# Patient Record
Sex: FEMALE | Race: WHITE | NOT HISPANIC OR LATINO
[De-identification: names, ages, dates, MRNs, and addresses within clinical notes are randomized per-mention and may not be internally consistent; named-entity substitution may affect disease eponyms.]

---

## 2017-03-15 ENCOUNTER — NON-APPOINTMENT (OUTPATIENT)
Age: 62
End: 2017-03-15

## 2017-03-15 ENCOUNTER — APPOINTMENT (OUTPATIENT)
Dept: INTERNAL MEDICINE | Facility: CLINIC | Age: 62
End: 2017-03-15

## 2017-03-15 VITALS — DIASTOLIC BLOOD PRESSURE: 82 MMHG | SYSTOLIC BLOOD PRESSURE: 130 MMHG

## 2017-03-15 VITALS — BODY MASS INDEX: 22.47 KG/M2 | WEIGHT: 119 LBS | HEIGHT: 61 IN

## 2017-03-15 VITALS — SYSTOLIC BLOOD PRESSURE: 130 MMHG | DIASTOLIC BLOOD PRESSURE: 80 MMHG

## 2017-05-02 ENCOUNTER — APPOINTMENT (OUTPATIENT)
Dept: INTERNAL MEDICINE | Facility: CLINIC | Age: 62
End: 2017-05-02

## 2017-05-06 ENCOUNTER — RX RENEWAL (OUTPATIENT)
Age: 62
End: 2017-05-06

## 2017-05-19 ENCOUNTER — RX RENEWAL (OUTPATIENT)
Age: 62
End: 2017-05-19

## 2017-06-01 ENCOUNTER — RX RENEWAL (OUTPATIENT)
Age: 62
End: 2017-06-01

## 2017-06-14 ENCOUNTER — RECORD ABSTRACTING (OUTPATIENT)
Age: 62
End: 2017-06-14

## 2017-06-14 DIAGNOSIS — R09.82 POSTNASAL DRIP: ICD-10-CM

## 2017-06-19 ENCOUNTER — LABORATORY RESULT (OUTPATIENT)
Age: 62
End: 2017-06-19

## 2017-06-19 ENCOUNTER — APPOINTMENT (OUTPATIENT)
Dept: INTERNAL MEDICINE | Facility: CLINIC | Age: 62
End: 2017-06-19

## 2017-06-19 ENCOUNTER — NON-APPOINTMENT (OUTPATIENT)
Age: 62
End: 2017-06-19

## 2017-06-19 VITALS — WEIGHT: 120 LBS | HEIGHT: 61 IN | BODY MASS INDEX: 22.66 KG/M2

## 2017-06-19 VITALS — SYSTOLIC BLOOD PRESSURE: 138 MMHG | DIASTOLIC BLOOD PRESSURE: 82 MMHG

## 2017-06-19 VITALS — DIASTOLIC BLOOD PRESSURE: 88 MMHG | SYSTOLIC BLOOD PRESSURE: 138 MMHG

## 2017-06-19 LAB
BILIRUB UR QL STRIP: NORMAL
CLARITY UR: CLEAR
COLLECTION METHOD: NORMAL
GLUCOSE UR-MCNC: NORMAL
HCG UR QL: 0.2 EU/DL
HGB UR QL STRIP.AUTO: NORMAL
KETONES UR-MCNC: NORMAL
LEUKOCYTE ESTERASE UR QL STRIP: NORMAL
NITRITE UR QL STRIP: NORMAL
PH UR STRIP: 6
PROT UR STRIP-MCNC: NORMAL
SP GR UR STRIP: 1.01

## 2017-06-20 LAB
25(OH)D3 SERPL-MCNC: 41.2 NG/ML
ALBUMIN SERPL ELPH-MCNC: 4.3 G/DL
ALP BLD-CCNC: 61 U/L
ALT SERPL-CCNC: 18 U/L
ANION GAP SERPL CALC-SCNC: 15 MMOL/L
AST SERPL-CCNC: 25 U/L
BASOPHILS # BLD AUTO: 0.06 K/UL
BASOPHILS NFR BLD AUTO: 0.8 %
BILIRUB SERPL-MCNC: 0.2 MG/DL
BUN SERPL-MCNC: 29 MG/DL
CALCIUM SERPL-MCNC: 10.2 MG/DL
CHLORIDE SERPL-SCNC: 102 MMOL/L
CHOLEST SERPL-MCNC: 149 MG/DL
CHOLEST/HDLC SERPL: 3.4 RATIO
CO2 SERPL-SCNC: 23 MMOL/L
CREAT SERPL-MCNC: 1.14 MG/DL
EOSINOPHIL # BLD AUTO: 0.25 K/UL
EOSINOPHIL NFR BLD AUTO: 3.5 %
GLUCOSE SERPL-MCNC: 104 MG/DL
HBA1C MFR BLD HPLC: 5.6 %
HCT VFR BLD CALC: 40.1 %
HDLC SERPL-MCNC: 44 MG/DL
HGB BLD-MCNC: 13.2 G/DL
IMM GRANULOCYTES NFR BLD AUTO: 0.1 %
LDLC SERPL CALC-MCNC: 81 MG/DL
LYMPHOCYTES # BLD AUTO: 1.59 K/UL
LYMPHOCYTES NFR BLD AUTO: 22.4 %
MAN DIFF?: NORMAL
MCHC RBC-ENTMCNC: 31.3 PG
MCHC RBC-ENTMCNC: 32.9 GM/DL
MCV RBC AUTO: 95 FL
MONOCYTES # BLD AUTO: 0.52 K/UL
MONOCYTES NFR BLD AUTO: 7.3 %
NEUTROPHILS # BLD AUTO: 4.66 K/UL
NEUTROPHILS NFR BLD AUTO: 65.9 %
PLATELET # BLD AUTO: 320 K/UL
POTASSIUM SERPL-SCNC: 4.1 MMOL/L
PROT SERPL-MCNC: 7.7 G/DL
RBC # BLD: 4.22 M/UL
RBC # FLD: 12.8 %
SAVE SPECIMEN: NORMAL
SODIUM SERPL-SCNC: 140 MMOL/L
T3RU NFR SERPL: 1.04 INDEX
T4 SERPL-MCNC: 8.1 UG/DL
TRIGL SERPL-MCNC: 118 MG/DL
TSH SERPL-ACNC: 2.4 UIU/ML
URATE SERPL-MCNC: 5.7 MG/DL
WBC # FLD AUTO: 7.09 K/UL

## 2017-09-26 ENCOUNTER — LABORATORY RESULT (OUTPATIENT)
Age: 62
End: 2017-09-26

## 2017-09-26 ENCOUNTER — APPOINTMENT (OUTPATIENT)
Dept: INTERNAL MEDICINE | Facility: CLINIC | Age: 62
End: 2017-09-26
Payer: COMMERCIAL

## 2017-09-26 VITALS — DIASTOLIC BLOOD PRESSURE: 88 MMHG | SYSTOLIC BLOOD PRESSURE: 138 MMHG

## 2017-09-26 VITALS — BODY MASS INDEX: 22.28 KG/M2 | WEIGHT: 118 LBS | HEIGHT: 61 IN

## 2017-09-26 VITALS — SYSTOLIC BLOOD PRESSURE: 122 MMHG | DIASTOLIC BLOOD PRESSURE: 80 MMHG

## 2017-09-26 PROCEDURE — 90686 IIV4 VACC NO PRSV 0.5 ML IM: CPT

## 2017-09-26 PROCEDURE — 36415 COLL VENOUS BLD VENIPUNCTURE: CPT

## 2017-09-26 PROCEDURE — G0008: CPT

## 2017-09-26 PROCEDURE — 99214 OFFICE O/P EST MOD 30 MIN: CPT | Mod: 25

## 2017-09-27 LAB
25(OH)D3 SERPL-MCNC: 37.2 NG/ML
ALBUMIN SERPL ELPH-MCNC: 4.7 G/DL
ALP BLD-CCNC: 57 U/L
ALT SERPL-CCNC: 15 U/L
ANION GAP SERPL CALC-SCNC: 17 MMOL/L
AST SERPL-CCNC: 24 U/L
BASOPHILS # BLD AUTO: 0.06 K/UL
BASOPHILS NFR BLD AUTO: 0.8 %
BILIRUB SERPL-MCNC: 0.5 MG/DL
BUN SERPL-MCNC: 26 MG/DL
CALCIUM SERPL-MCNC: 10.3 MG/DL
CHLORIDE SERPL-SCNC: 97 MMOL/L
CHOLEST SERPL-MCNC: 180 MG/DL
CHOLEST/HDLC SERPL: 3.6 RATIO
CO2 SERPL-SCNC: 26 MMOL/L
CREAT SERPL-MCNC: 1.03 MG/DL
EOSINOPHIL # BLD AUTO: 0.13 K/UL
EOSINOPHIL NFR BLD AUTO: 1.7 %
GLUCOSE SERPL-MCNC: 87 MG/DL
HBA1C MFR BLD HPLC: 5.5 %
HCT VFR BLD CALC: 41.7 %
HDLC SERPL-MCNC: 50 MG/DL
HGB BLD-MCNC: 13.7 G/DL
IMM GRANULOCYTES NFR BLD AUTO: 0.3 %
LDLC SERPL CALC-MCNC: 86 MG/DL
LYMPHOCYTES # BLD AUTO: 1.87 K/UL
LYMPHOCYTES NFR BLD AUTO: 23.8 %
MAN DIFF?: NORMAL
MCHC RBC-ENTMCNC: 30.5 PG
MCHC RBC-ENTMCNC: 32.9 GM/DL
MCV RBC AUTO: 92.9 FL
MONOCYTES # BLD AUTO: 0.64 K/UL
MONOCYTES NFR BLD AUTO: 8.2 %
NEUTROPHILS # BLD AUTO: 5.13 K/UL
NEUTROPHILS NFR BLD AUTO: 65.2 %
PLATELET # BLD AUTO: 394 K/UL
POTASSIUM SERPL-SCNC: 4.4 MMOL/L
PROT SERPL-MCNC: 8.2 G/DL
RBC # BLD: 4.49 M/UL
RBC # FLD: 14.8 %
SAVE SPECIMEN: NORMAL
SODIUM SERPL-SCNC: 140 MMOL/L
T3RU NFR SERPL: 1.04 INDEX
T4 SERPL-MCNC: 8.5 UG/DL
TRIGL SERPL-MCNC: 220 MG/DL
TSH SERPL-ACNC: 2.03 UIU/ML
URATE SERPL-MCNC: 5.5 MG/DL
WBC # FLD AUTO: 7.85 K/UL

## 2018-01-24 ENCOUNTER — RX RENEWAL (OUTPATIENT)
Age: 63
End: 2018-01-24

## 2018-02-03 ENCOUNTER — RX RENEWAL (OUTPATIENT)
Age: 63
End: 2018-02-03

## 2018-02-22 ENCOUNTER — TRANSCRIPTION ENCOUNTER (OUTPATIENT)
Age: 63
End: 2018-02-22

## 2018-03-07 ENCOUNTER — APPOINTMENT (OUTPATIENT)
Dept: INTERNAL MEDICINE | Facility: CLINIC | Age: 63
End: 2018-03-07
Payer: COMMERCIAL

## 2018-03-07 ENCOUNTER — MEDICATION RENEWAL (OUTPATIENT)
Age: 63
End: 2018-03-07

## 2018-03-07 VITALS — HEART RATE: 91 BPM | OXYGEN SATURATION: 97 %

## 2018-03-07 VITALS
WEIGHT: 121 LBS | HEIGHT: 61 IN | HEART RATE: 74 BPM | DIASTOLIC BLOOD PRESSURE: 82 MMHG | SYSTOLIC BLOOD PRESSURE: 138 MMHG | TEMPERATURE: 98.2 F | BODY MASS INDEX: 22.84 KG/M2

## 2018-03-07 PROCEDURE — 94010 BREATHING CAPACITY TEST: CPT

## 2018-03-07 PROCEDURE — 99214 OFFICE O/P EST MOD 30 MIN: CPT | Mod: 25

## 2018-03-12 LAB
DOPAMINE UR-MCNC: 123 UG/L
DOPAMINE, UR, 24HR: 178 UG/24 HR
EPINEPH UR-MCNC: 3 UG/L
EPINEPHRINE, U, 24HR: 4 UG/24 HR
NOREPINEPH UR-MCNC: 26 UG/L
NOREPINEPHRINE,U,24H: 38 UG/24 HR

## 2018-03-23 ENCOUNTER — LABORATORY RESULT (OUTPATIENT)
Age: 63
End: 2018-03-23

## 2018-03-23 ENCOUNTER — APPOINTMENT (OUTPATIENT)
Dept: INTERNAL MEDICINE | Facility: CLINIC | Age: 63
End: 2018-03-23
Payer: COMMERCIAL

## 2018-03-23 VITALS
HEIGHT: 61 IN | WEIGHT: 121 LBS | SYSTOLIC BLOOD PRESSURE: 132 MMHG | DIASTOLIC BLOOD PRESSURE: 78 MMHG | BODY MASS INDEX: 22.84 KG/M2

## 2018-03-23 PROCEDURE — 99214 OFFICE O/P EST MOD 30 MIN: CPT | Mod: 25

## 2018-03-23 PROCEDURE — 36415 COLL VENOUS BLD VENIPUNCTURE: CPT

## 2018-03-23 RX ORDER — MONTELUKAST SODIUM 10 MG/1
10 TABLET, FILM COATED ORAL
Qty: 1 | Refills: 3 | Status: DISCONTINUED | COMMUNITY
Start: 2018-03-07 | End: 2018-03-23

## 2018-03-23 RX ORDER — FLUTICASONE PROPIONATE 50 UG/1
50 SPRAY, METERED NASAL DAILY
Qty: 1 | Refills: 0 | Status: DISCONTINUED | COMMUNITY
Start: 2018-03-07 | End: 2018-03-23

## 2018-03-24 LAB
25(OH)D3 SERPL-MCNC: 35.2 NG/ML
ALBUMIN SERPL ELPH-MCNC: 4.2 G/DL
ALP BLD-CCNC: 62 U/L
ALT SERPL-CCNC: 16 U/L
ANION GAP SERPL CALC-SCNC: 14 MMOL/L
AST SERPL-CCNC: 22 U/L
BASOPHILS # BLD AUTO: 0.06 K/UL
BASOPHILS NFR BLD AUTO: 0.9 %
BILIRUB SERPL-MCNC: 0.4 MG/DL
BUN SERPL-MCNC: 22 MG/DL
CALCIUM SERPL-MCNC: 10.5 MG/DL
CHLORIDE SERPL-SCNC: 100 MMOL/L
CHOLEST SERPL-MCNC: 161 MG/DL
CHOLEST/HDLC SERPL: 3.1 RATIO
CO2 SERPL-SCNC: 28 MMOL/L
CREAT SERPL-MCNC: 1.07 MG/DL
EOSINOPHIL # BLD AUTO: 0.14 K/UL
EOSINOPHIL NFR BLD AUTO: 2.1 %
GLUCOSE SERPL-MCNC: 84 MG/DL
HBA1C MFR BLD HPLC: 5.6 %
HCT VFR BLD CALC: 40.1 %
HDLC SERPL-MCNC: 52 MG/DL
HGB BLD-MCNC: 13.1 G/DL
IMM GRANULOCYTES NFR BLD AUTO: 0.3 %
LDLC SERPL CALC-MCNC: 82 MG/DL
LYMPHOCYTES # BLD AUTO: 1.86 K/UL
LYMPHOCYTES NFR BLD AUTO: 28.1 %
MAN DIFF?: NORMAL
MCHC RBC-ENTMCNC: 30.9 PG
MCHC RBC-ENTMCNC: 32.7 GM/DL
MCV RBC AUTO: 94.6 FL
MONOCYTES # BLD AUTO: 0.85 K/UL
MONOCYTES NFR BLD AUTO: 12.8 %
NEUTROPHILS # BLD AUTO: 3.69 K/UL
NEUTROPHILS NFR BLD AUTO: 55.8 %
PLATELET # BLD AUTO: 358 K/UL
POTASSIUM SERPL-SCNC: 4 MMOL/L
PROT SERPL-MCNC: 8.1 G/DL
RBC # BLD: 4.24 M/UL
RBC # FLD: 12.8 %
SAVE SPECIMEN: NORMAL
SODIUM SERPL-SCNC: 142 MMOL/L
T3RU NFR SERPL: 0.96 INDEX
T4 SERPL-MCNC: 7.8 UG/DL
TRIGL SERPL-MCNC: 134 MG/DL
TSH SERPL-ACNC: 3.94 UIU/ML
URATE SERPL-MCNC: 5.1 MG/DL
WBC # FLD AUTO: 6.62 K/UL

## 2018-06-19 ENCOUNTER — APPOINTMENT (OUTPATIENT)
Dept: INTERNAL MEDICINE | Facility: CLINIC | Age: 63
End: 2018-06-19
Payer: COMMERCIAL

## 2018-07-02 ENCOUNTER — NON-APPOINTMENT (OUTPATIENT)
Age: 63
End: 2018-07-02

## 2018-07-02 ENCOUNTER — APPOINTMENT (OUTPATIENT)
Dept: INTERNAL MEDICINE | Facility: CLINIC | Age: 63
End: 2018-07-02
Payer: COMMERCIAL

## 2018-07-02 ENCOUNTER — LABORATORY RESULT (OUTPATIENT)
Age: 63
End: 2018-07-02

## 2018-07-02 VITALS — HEIGHT: 61 IN | BODY MASS INDEX: 22.47 KG/M2 | WEIGHT: 119 LBS

## 2018-07-02 PROCEDURE — 93000 ELECTROCARDIOGRAM COMPLETE: CPT

## 2018-07-02 PROCEDURE — 99214 OFFICE O/P EST MOD 30 MIN: CPT | Mod: 25

## 2018-07-02 PROCEDURE — 36415 COLL VENOUS BLD VENIPUNCTURE: CPT

## 2018-07-02 RX ORDER — HYDROCODONE BITARTRATE AND HOMATROPINE METHYLBROMIDE 5; 1.5 MG/5ML; MG/5ML
5-1.5 SYRUP ORAL EVERY 6 HOURS
Qty: 1 | Refills: 0 | Status: DISCONTINUED | COMMUNITY
Start: 2018-03-07 | End: 2018-07-02

## 2018-07-02 RX ORDER — PROMETHAZINE HYDROCHLORIDE AND CODEINE PHOSPHATE 6.25; 1 MG/5ML; MG/5ML
6.25-1 SOLUTION ORAL
Qty: 120 | Refills: 0 | Status: DISCONTINUED | COMMUNITY
Start: 2018-03-07 | End: 2018-07-02

## 2018-07-02 NOTE — PHYSICAL EXAM
[No Acute Distress] : no acute distress [Well Nourished] : well nourished [Well Developed] : well developed [Normal Sclera/Conjunctiva] : normal sclera/conjunctiva [No JVD] : no jugular venous distention [No Respiratory Distress] : no respiratory distress  [Clear to Auscultation] : lungs were clear to auscultation bilaterally [No Accessory Muscle Use] : no accessory muscle use [Soft] : abdomen soft [No CVA Tenderness] : no CVA  tenderness [No Rash] : no rash [No Focal Deficits] : no focal deficits [Normal Affect] : the affect was normal [de-identified] : Multiple sclerosis healing no evidence of infection

## 2018-07-02 NOTE — HISTORY OF PRESENT ILLNESS
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

## 2018-07-03 LAB
25(OH)D3 SERPL-MCNC: 29.8 NG/ML
ALBUMIN SERPL ELPH-MCNC: 4.5 G/DL
ALP BLD-CCNC: 86 U/L
ALT SERPL-CCNC: 24 U/L
ANION GAP SERPL CALC-SCNC: 17 MMOL/L
AST SERPL-CCNC: 23 U/L
BASOPHILS # BLD AUTO: 0.08 K/UL
BASOPHILS NFR BLD AUTO: 1 %
BILIRUB SERPL-MCNC: 0.3 MG/DL
BUN SERPL-MCNC: 32 MG/DL
CALCIUM SERPL-MCNC: 10.2 MG/DL
CHLORIDE SERPL-SCNC: 100 MMOL/L
CHOLEST SERPL-MCNC: 221 MG/DL
CHOLEST/HDLC SERPL: 3.6 RATIO
CO2 SERPL-SCNC: 25 MMOL/L
CREAT SERPL-MCNC: 1.22 MG/DL
EOSINOPHIL # BLD AUTO: 0.26 K/UL
EOSINOPHIL NFR BLD AUTO: 3.2 %
GLUCOSE SERPL-MCNC: 97 MG/DL
HBA1C MFR BLD HPLC: 5 %
HCT VFR BLD CALC: 37.6 %
HDLC SERPL-MCNC: 62 MG/DL
HGB BLD-MCNC: 11.9 G/DL
IMM GRANULOCYTES NFR BLD AUTO: 0.2 %
LDLC SERPL CALC-MCNC: 125 MG/DL
LYMPHOCYTES # BLD AUTO: 2.17 K/UL
LYMPHOCYTES NFR BLD AUTO: 26.3 %
MAN DIFF?: NORMAL
MCHC RBC-ENTMCNC: 30.3 PG
MCHC RBC-ENTMCNC: 31.6 GM/DL
MCV RBC AUTO: 95.7 FL
MONOCYTES # BLD AUTO: 0.8 K/UL
MONOCYTES NFR BLD AUTO: 9.7 %
NEUTROPHILS # BLD AUTO: 4.92 K/UL
NEUTROPHILS NFR BLD AUTO: 59.6 %
PLATELET # BLD AUTO: 281 K/UL
POTASSIUM SERPL-SCNC: 3.9 MMOL/L
PROT SERPL-MCNC: 7.7 G/DL
RBC # BLD: 3.93 M/UL
RBC # FLD: 15.5 %
SAVE SPECIMEN: NORMAL
SODIUM SERPL-SCNC: 142 MMOL/L
T3RU NFR SERPL: 1.06 INDEX
T4 SERPL-MCNC: 7.3 UG/DL
TRIGL SERPL-MCNC: 171 MG/DL
TSH SERPL-ACNC: 2.45 UIU/ML
URATE SERPL-MCNC: 5.2 MG/DL
WBC # FLD AUTO: 8.25 K/UL

## 2018-08-17 ENCOUNTER — OTHER (OUTPATIENT)
Age: 63
End: 2018-08-17

## 2018-08-17 ENCOUNTER — EMERGENCY (EMERGENCY)
Facility: HOSPITAL | Age: 63
LOS: 1 days | Discharge: HOME | End: 2018-08-17
Attending: EMERGENCY MEDICINE | Admitting: EMERGENCY MEDICINE
Payer: COMMERCIAL

## 2018-08-17 VITALS
OXYGEN SATURATION: 99 % | RESPIRATION RATE: 16 BRPM | HEART RATE: 85 BPM | DIASTOLIC BLOOD PRESSURE: 94 MMHG | SYSTOLIC BLOOD PRESSURE: 149 MMHG | TEMPERATURE: 98 F

## 2018-08-17 LAB
APPEARANCE UR: CLEAR — SIGNIFICANT CHANGE UP
BACTERIA # UR AUTO: NEGATIVE — SIGNIFICANT CHANGE UP
BILIRUB UR-MCNC: NEGATIVE — SIGNIFICANT CHANGE UP
BLOOD UR QL VISUAL: NEGATIVE — SIGNIFICANT CHANGE UP
COLOR SPEC: SIGNIFICANT CHANGE UP
GLUCOSE UR-MCNC: NEGATIVE — SIGNIFICANT CHANGE UP
KETONES UR-MCNC: NEGATIVE — SIGNIFICANT CHANGE UP
LEUKOCYTE ESTERASE UR-ACNC: SIGNIFICANT CHANGE UP
NITRITE UR-MCNC: NEGATIVE — SIGNIFICANT CHANGE UP
PH UR: 6 — SIGNIFICANT CHANGE UP (ref 5–8)
PROT UR-MCNC: SIGNIFICANT CHANGE UP
RBC CASTS # UR COMP ASSIST: SIGNIFICANT CHANGE UP (ref 0–?)
SP GR SPEC: 1.02 — SIGNIFICANT CHANGE UP (ref 1–1.04)
SQUAMOUS # UR AUTO: SIGNIFICANT CHANGE UP
UROBILINOGEN FLD QL: NORMAL — SIGNIFICANT CHANGE UP
WBC CASTS UR QL COMP ASSIST: NEGATIVE — SIGNIFICANT CHANGE UP
WBC UR QL: HIGH (ref 0–?)

## 2018-08-17 PROCEDURE — 93971 EXTREMITY STUDY: CPT | Mod: 26,LT

## 2018-08-17 PROCEDURE — 99284 EMERGENCY DEPT VISIT MOD MDM: CPT

## 2018-08-17 NOTE — ED PROVIDER NOTE - OBJECTIVE STATEMENT
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 Pt states she noticed it while she was on vacation in [place]- Called her PMD who recommends an doppler but would not have results back today so to go to the ED. Pt is leaving for [facility] for grandsons 2nd birthday. Denies fevers, chills, chest pain, SOB ,cough, n/v/d, abdominal pain, numbness, tingling, weakness, urinary symptoms. Ambulating without difficulty. 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 Pt states she noticed it while she was on vacation in [place]- Called her PMD who recommends an doppler but would not have results back today so to go to the ED. Pt is leaving for [facility] for grandsons 2nd birthday. Denies fevers, chills, chest pain, SOB ,cough, n/v/d, abdominal pain, numbness, tingling, weakness, urinary symptoms. Ambulating without difficulty. 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 Pt states she noticed it while she was on vacation in [place]- Called her PMD who recommends an doppler but would not have results back today so to go to the ED. Pt is leaving for [facility] for grandsons 2nd birthday. Denies fevers, chills, chest pain, SOB ,cough, n/v/d, abdominal pain, numbness, tingling, weakness, urinary symptoms. Ambulating without difficulty.  18:00 Zane att: 63F h/o htn, von-hippel-lindau syndrome s/p 63 y.o female pmhx of HTN and Von-hippel-lindau syndrome s/p R sided partial nephrectomy here with LLE swelling for the past 1.5 weeks. Pt states she noticed it while she was on vacation in [place]- Called her PMD who recommends an doppler but would not have results back today so to go to the ED. Pt is leaving for [facility] for grandsons 2nd birthday. Denies fevers, chills, chest pain, SOB ,cough, n/v/d, abdominal pain, numbness, tingling, weakness, urinary symptoms. Ambulating without difficulty.  18:00 Zane att: 63F h/o htn, von-hippel-lindau syndrome, rt partial nephrectomy, s/p remote lt posterior tibial tendon repair c/o lle swelling x 1-2 wk. Patient recently flew from [place], < 6 h flight, during trip noted lt ankle swollen. PCP referred to ER for duplex. Pt denies cp, sob, pleurisy, leg pain, h/o dvt, trauma.

## 2018-08-17 NOTE — ED PROVIDER NOTE - CARE PLAN
Assessment and plan of treatment:	Continue all previously prescribed medications as directed.  Follow up with your primary care physician in 48-72 hours- bring copies of your results have repeat US in 1 week.  Return to the Emergency Department for fevers, worsening or persistent symptoms OR ANY NEW OR CONCERNING SYMPTOMS. Principal Discharge DX:	Ankle swelling, left  Assessment and plan of treatment:	Continue all previously prescribed medications as directed.  Follow up with your primary care physician in 48-72 hours- bring copies of your results have repeat US in 1 week.  Return to the Emergency Department for fevers, worsening or persistent symptoms OR ANY NEW OR CONCERNING SYMPTOMS.

## 2018-08-17 NOTE — ED PROVIDER NOTE - PLAN OF CARE
Continue all previously prescribed medications as directed.  Follow up with your primary care physician in 48-72 hours- bring copies of your results have repeat US in 1 week.  Return to the Emergency Department for fevers, worsening or persistent symptoms OR ANY NEW OR CONCERNING SYMPTOMS.

## 2018-08-17 NOTE — ED ADULT TRIAGE NOTE - CHIEF COMPLAINT QUOTE
Pt s/o partial nephrectomy 10 weeks  ago , c/o L leg swelling x 1 week.  Pt with hx of VHL, hypertension

## 2018-08-17 NOTE — ED PROVIDER NOTE - ATTENDING CONTRIBUTION TO CARE
Dr. Degroot: I performed a face to face bedside interview with patient regarding history of present illness, review of symptoms and past medical history. I completed an independent physical exam.  I have discussed patient's plan of care with PA.   I agree with note as stated above, having amended the EMR as needed to reflect my findings.   This includes HISTORY OF PRESENT ILLNESS, HIV, PAST MEDICAL/SURGICAL/FAMILY/SOCIAL HISTORY, ALLERGIES AND HOME MEDICATIONS, REVIEW OF SYSTEMS, PHYSICAL EXAM, and any PROGRESS NOTES during the time I functioned as the attending physician for this patient. HPI above as by me. PE above as by me. R/o duplex, given ankle flattening suspect ligamenous injury, pt confirms h/o lt posterior tibial tendon rupture, will f/u with her podiatrist in a few days if duplex neg.

## 2018-08-17 NOTE — ED PROVIDER NOTE - PHYSICAL EXAMINATION
abdominal surgical incision site healing well  no calf tenderness/erythema or skin changes  no ankle tenderness, FROM  LLE with mildly swelling at distal leg compared to RLE abdominal surgical incision site healing well  no calf tenderness/erythema or skin changes  no ankle tenderness, FROM  LLE with mildly swelling at distal leg compared to RLE    Zane att: nad, aaox3, ctabl, rrr, s1s2, abd soft ntnd, neg kilo's sign b/l, rt ankle slight flattening compared to lt with intact rom

## 2018-08-17 NOTE — ED PROVIDER NOTE - PROGRESS NOTE DETAILS
charmaine cunningham; US no dvt- reports printed for patient to follow up with PMD, advised repeat US with them in 1 week, return precautions given. ready for dc.

## 2018-08-17 NOTE — ED PROVIDER NOTE - MEDICAL DECISION MAKING DETAILS
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

## 2018-09-19 ENCOUNTER — APPOINTMENT (OUTPATIENT)
Dept: INTERNAL MEDICINE | Facility: CLINIC | Age: 63
End: 2018-09-19

## 2018-10-31 ENCOUNTER — RX RENEWAL (OUTPATIENT)
Age: 63
End: 2018-10-31

## 2018-12-11 ENCOUNTER — APPOINTMENT (OUTPATIENT)
Dept: INTERNAL MEDICINE | Facility: CLINIC | Age: 63
End: 2018-12-11
Payer: COMMERCIAL

## 2018-12-11 ENCOUNTER — NON-APPOINTMENT (OUTPATIENT)
Age: 63
End: 2018-12-11

## 2018-12-11 VITALS — SYSTOLIC BLOOD PRESSURE: 142 MMHG | DIASTOLIC BLOOD PRESSURE: 82 MMHG

## 2018-12-11 VITALS
BODY MASS INDEX: 23.03 KG/M2 | DIASTOLIC BLOOD PRESSURE: 80 MMHG | HEIGHT: 61 IN | SYSTOLIC BLOOD PRESSURE: 118 MMHG | WEIGHT: 122 LBS

## 2018-12-11 PROCEDURE — 99396 PREV VISIT EST AGE 40-64: CPT | Mod: 25

## 2018-12-11 PROCEDURE — 81003 URINALYSIS AUTO W/O SCOPE: CPT | Mod: QW

## 2018-12-11 PROCEDURE — 93000 ELECTROCARDIOGRAM COMPLETE: CPT

## 2018-12-11 RX ORDER — CLOBETASOL PROPIONATE 0.5 MG/G
0.05 CREAM TOPICAL TWICE DAILY
Refills: 0 | Status: DISCONTINUED | COMMUNITY
Start: 2018-07-02 | End: 2018-12-11

## 2018-12-11 NOTE — PHYSICAL EXAM
[No Acute Distress] : no acute distress [Well Nourished] : well nourished [Well Developed] : well developed [Well-Appearing] : well-appearing [Normal Sclera/Conjunctiva] : normal sclera/conjunctiva [PERRL] : pupils equal round and reactive to light [EOMI] : extraocular movements intact [Fundoscopic Exam Performed] : fundoscopic ~T exam ~C was performed [Normal Outer Ear/Nose] : the outer ears and nose were normal in appearance [Normal Oropharynx] : the oropharynx was normal [No JVD] : no jugular venous distention [Supple] : supple [No Lymphadenopathy] : no lymphadenopathy [Thyroid Normal, No Nodules] : the thyroid was normal and there were no nodules present [No Respiratory Distress] : no respiratory distress  [Clear to Auscultation] : lungs were clear to auscultation bilaterally [No Accessory Muscle Use] : no accessory muscle use [Normal Rate] : normal rate  [Regular Rhythm] : with a regular rhythm [Normal S1, S2] : normal S1 and S2 [No Murmur] : no murmur heard [No Carotid Bruits] : no carotid bruits [No Abdominal Bruit] : a ~M bruit was not heard ~T in the abdomen [No Varicosities] : no varicosities [Pedal Pulses Present] : the pedal pulses are present [No Edema] : there was no peripheral edema [No Extremity Clubbing/Cyanosis] : no extremity clubbing/cyanosis [No Palpable Aorta] : no palpable aorta [Normal Appearance] : normal in appearance [No Nipple Discharge] : no nipple discharge [Soft] : abdomen soft [Non Tender] : non-tender [Non-distended] : non-distended [No Masses] : no abdominal mass palpated [No HSM] : no HSM [Normal Bowel Sounds] : normal bowel sounds [Normal Sphincter Tone] : normal sphincter tone [No Mass] : no mass [Normal Posterior Cervical Nodes] : no posterior cervical lymphadenopathy [Normal Anterior Cervical Nodes] : no anterior cervical lymphadenopathy [No CVA Tenderness] : no CVA  tenderness [No Spinal Tenderness] : no spinal tenderness [No Joint Swelling] : no joint swelling [Grossly Normal Strength/Tone] : grossly normal strength/tone [No Rash] : no rash [Normal Gait] : normal gait [Coordination Grossly Intact] : coordination grossly intact [No Focal Deficits] : no focal deficits [Deep Tendon Reflexes (DTR)] : deep tendon reflexes were 2+ and symmetric [Normal Affect] : the affect was normal [Normal Insight/Judgement] : insight and judgment were intact [Stool Occult Blood] : stool negative for occult blood

## 2018-12-11 NOTE — ASSESSMENT
[FreeTextEntry1] : This is a upbeat 63-year-old female whose history is reviewed above\par \par She has a history of hypertension her blood pressure occasionally goes above goal however as soon as she is distracted her blood pressure is at target she has no orthostasis no medication changes\par \par She has a history of hypercholesterolemia she remains on a statin for cholesterol profile has been obtained medication changes per dictated on the results\par \par She has multiple renal masses retinal angiomas and minimal CNS vascular entities. She is followed up appropriately. She continues to have the renal masses removed as they become ominous\par \par In addition she is considering entering a study which we inhibit renal mass growth\par \par She does have a mild gastroenteritis which the other members of her house also are experiencing so we deferred blood tests I will repeat them in a week\par \par She had a colonoscopy in 2014 and a stress test which was normal in 2015\par \par She does have a colonic polyp which was description seem to be hyperplastic but I told her to check with her colonoscopy just to see when she is due\par \par She will schedule mammography and OB/GYN. Bone density she did have a flu shot. In terms of further are vaccinations she should discuss this with the study group\par

## 2018-12-11 NOTE — HISTORY OF PRESENT ILLNESS
[FreeTextEntry1] : This is a 63-year-old female for annual health assessment [de-identified] : Specifically we will address her history of von Hippel-Lindau disease hypercholesterolemia hypertension renal carcinoma colon polyp retinal angiomas\par \par She feels well from a systemic standpoint. She is suffering from a mild viral gastroenteritis\par \par She has no GI cardiac respiratory neurologic complaints.\par \par She is going to get into a new study\par \par

## 2018-12-11 NOTE — HEALTH RISK ASSESSMENT
[Good] : ~his/her~  mood as  good [No falls in past year] : Patient reported no falls in the past year [0] : 2) Feeling down, depressed, or hopeless: Not at all (0) [Change in mental status noted] : Change in mental status noted [None] : None [With Significant Other] : lives with significant other [College] : College [] :  [Sexually Active] : sexually active [Feels Safe at Home] : Feels safe at home [Fully functional (bathing, dressing, toileting, transferring, walking, feeding)] : Fully functional (bathing, dressing, toileting, transferring, walking, feeding) [Fully functional (using the telephone, shopping, preparing meals, housekeeping, doing laundry, using] : Fully functional and needs no help or supervision to perform IADLs (using the telephone, shopping, preparing meals, housekeeping, doing laundry, using transportation, managing medications and managing finances) [Smoke Detector] : smoke detector [Carbon Monoxide Detector] : carbon monoxide detector [Seat Belt] :  uses seat belt [Sunscreen] : uses sunscreen [] : No [High Risk Behavior] : no high risk behavior [Reports changes in hearing] : Reports no changes in hearing [Reports changes in vision] : Reports no changes in vision [Reports changes in dental health] : Reports no changes in dental health [Guns at Home] : no guns at home [Safety elements used in home] : no safety elements used in home [Travel to Developing Areas] : does not  travel to developing areas [TB Exposure] : is not being exposed to tuberculosis [Caregiver Concerns] : does not have caregiver concerns [MammogramDate] : 12/2017 [ColonoscopyDate] : 05/2015

## 2018-12-20 ENCOUNTER — LABORATORY RESULT (OUTPATIENT)
Age: 63
End: 2018-12-20

## 2018-12-20 ENCOUNTER — APPOINTMENT (OUTPATIENT)
Dept: INTERNAL MEDICINE | Facility: CLINIC | Age: 63
End: 2018-12-20
Payer: COMMERCIAL

## 2018-12-20 PROCEDURE — 36415 COLL VENOUS BLD VENIPUNCTURE: CPT

## 2018-12-21 LAB
25(OH)D3 SERPL-MCNC: 31.8 NG/ML
ALBUMIN SERPL ELPH-MCNC: 4.5 G/DL
ALP BLD-CCNC: 68 U/L
ALT SERPL-CCNC: 17 U/L
ANION GAP SERPL CALC-SCNC: 12 MMOL/L
AST SERPL-CCNC: 23 U/L
BASOPHILS # BLD AUTO: 0.05 K/UL
BASOPHILS NFR BLD AUTO: 0.8 %
BILIRUB SERPL-MCNC: 0.2 MG/DL
BUN SERPL-MCNC: 26 MG/DL
CALCIUM SERPL-MCNC: 9.6 MG/DL
CHLORIDE SERPL-SCNC: 100 MMOL/L
CHOLEST SERPL-MCNC: 155 MG/DL
CHOLEST/HDLC SERPL: 3.6 RATIO
CO2 SERPL-SCNC: 27 MMOL/L
CREAT SERPL-MCNC: 1.21 MG/DL
EOSINOPHIL # BLD AUTO: 0.27 K/UL
EOSINOPHIL NFR BLD AUTO: 4.4 %
FERRITIN SERPL-MCNC: 45 NG/ML
GLUCOSE SERPL-MCNC: 84 MG/DL
HBA1C MFR BLD HPLC: 5.4 %
HCT VFR BLD CALC: 41 %
HDLC SERPL-MCNC: 43 MG/DL
HGB BLD-MCNC: 13.4 G/DL
IMM GRANULOCYTES NFR BLD AUTO: 0.2 %
LDLC SERPL CALC-MCNC: 76 MG/DL
LYMPHOCYTES # BLD AUTO: 1.63 K/UL
LYMPHOCYTES NFR BLD AUTO: 26.5 %
MAN DIFF?: NORMAL
MCHC RBC-ENTMCNC: 30.8 PG
MCHC RBC-ENTMCNC: 32.7 GM/DL
MCV RBC AUTO: 94.3 FL
MONOCYTES # BLD AUTO: 0.64 K/UL
MONOCYTES NFR BLD AUTO: 10.4 %
NEUTROPHILS # BLD AUTO: 3.55 K/UL
NEUTROPHILS NFR BLD AUTO: 57.7 %
PLATELET # BLD AUTO: 256 K/UL
POTASSIUM SERPL-SCNC: 4 MMOL/L
PROT SERPL-MCNC: 7.4 G/DL
RBC # BLD: 4.35 M/UL
RBC # FLD: 13.7 %
SAVE SPECIMEN: NORMAL
SODIUM SERPL-SCNC: 139 MMOL/L
T3RU NFR SERPL: 1.03 INDEX
T4 SERPL-MCNC: 7.8 UG/DL
TRIGL SERPL-MCNC: 180 MG/DL
TSH SERPL-ACNC: 3.31 UIU/ML
URATE SERPL-MCNC: 5.2 MG/DL
VIT B12 SERPL-MCNC: 787 PG/ML
WBC # FLD AUTO: 6.15 K/UL

## 2019-01-30 ENCOUNTER — RX RENEWAL (OUTPATIENT)
Age: 64
End: 2019-01-30

## 2019-02-22 ENCOUNTER — APPOINTMENT (OUTPATIENT)
Dept: INTERNAL MEDICINE | Facility: CLINIC | Age: 64
End: 2019-02-22
Payer: COMMERCIAL

## 2019-02-22 ENCOUNTER — NON-APPOINTMENT (OUTPATIENT)
Age: 64
End: 2019-02-22

## 2019-02-22 VITALS — SYSTOLIC BLOOD PRESSURE: 112 MMHG | DIASTOLIC BLOOD PRESSURE: 70 MMHG

## 2019-02-22 VITALS
HEIGHT: 61 IN | SYSTOLIC BLOOD PRESSURE: 110 MMHG | WEIGHT: 122 LBS | DIASTOLIC BLOOD PRESSURE: 70 MMHG | BODY MASS INDEX: 23.03 KG/M2

## 2019-02-22 DIAGNOSIS — H33.20 SEROUS RETINAL DETACHMENT, UNSPECIFIED EYE: ICD-10-CM

## 2019-02-22 PROCEDURE — 93000 ELECTROCARDIOGRAM COMPLETE: CPT

## 2019-02-22 PROCEDURE — 99243 OFF/OP CNSLTJ NEW/EST LOW 30: CPT | Mod: 25

## 2019-02-22 NOTE — ASSESSMENT
[Patient Optimized for Surgery] : Patient optimized for surgery [FreeTextEntry4] : This is a 63-year-old female whose history has been reviewed above\par \par She has a history of von Hippel-Lindau.\par \par She has a history of hypertension her blood pressures well controlled\par \par She has history of hypercholesterolemia she is well-controlled on atorvastatin\par \par She has a history of renal masses that are being followed\par \par She has had a negative stress test in 2015\par \par Review of systems is negative her physical examination is stable\par \par Her EKG shows no ischemic changes\par \par No contraindications to this procedure

## 2019-02-22 NOTE — ASU PATIENT PROFILE, ADULT - PSH
History of  section    History of colonoscopy    Renal mass  (4) surgeries for excision  Status post cataract extraction  OU

## 2019-02-22 NOTE — ASU PATIENT PROFILE, ADULT - PMH
Hypercholesteremia    Hypertension    Renal cancer    Retinal angioma    VHL (von Hippel-Lindau syndrome)

## 2019-02-22 NOTE — HISTORY OF PRESENT ILLNESS
[No Pertinent Cardiac History] : no history of aortic stenosis, atrial fibrillation, coronary artery disease, recent myocardial infarction, or implantable device/pacemaker [No Pertinent Pulmonary History] : no history of asthma, COPD, sleep apnea, or smoking [FreeTextEntry1] : Detached retina [FreeTextEntry2] : 2/25 [FreeTextEntry3] : Dr. 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 She has a history of hypertension hypercholesterolemia renal tumors.\par \par She acutely presents with change visual function in her left eye which does have a history of disease involvement. She was noted to have a detached retina superimposed on her underlying disease and is scheduled for surgery.\par \par It should be noted that she is on protocol for this disease

## 2019-02-24 ENCOUNTER — TRANSCRIPTION ENCOUNTER (OUTPATIENT)
Age: 64
End: 2019-02-24

## 2019-02-25 ENCOUNTER — OUTPATIENT (OUTPATIENT)
Dept: OUTPATIENT SERVICES | Facility: HOSPITAL | Age: 64
LOS: 1 days | End: 2019-02-25
Payer: COMMERCIAL

## 2019-02-25 VITALS
DIASTOLIC BLOOD PRESSURE: 83 MMHG | TEMPERATURE: 98 F | RESPIRATION RATE: 16 BRPM | WEIGHT: 121.25 LBS | SYSTOLIC BLOOD PRESSURE: 136 MMHG | HEART RATE: 66 BPM | OXYGEN SATURATION: 97 % | HEIGHT: 61 IN

## 2019-02-25 VITALS
RESPIRATION RATE: 16 BRPM | HEART RATE: 89 BPM | OXYGEN SATURATION: 95 % | SYSTOLIC BLOOD PRESSURE: 108 MMHG | DIASTOLIC BLOOD PRESSURE: 69 MMHG

## 2019-02-25 DIAGNOSIS — N28.89 OTHER SPECIFIED DISORDERS OF KIDNEY AND URETER: Chronic | ICD-10-CM

## 2019-02-25 DIAGNOSIS — Z98.890 OTHER SPECIFIED POSTPROCEDURAL STATES: Chronic | ICD-10-CM

## 2019-02-25 DIAGNOSIS — H35.341 MACULAR CYST, HOLE, OR PSEUDOHOLE, RIGHT EYE: ICD-10-CM

## 2019-02-25 DIAGNOSIS — Z98.891 HISTORY OF UTERINE SCAR FROM PREVIOUS SURGERY: Chronic | ICD-10-CM

## 2019-02-25 DIAGNOSIS — H33.011 RETINAL DETACHMENT WITH SINGLE BREAK, RIGHT EYE: ICD-10-CM

## 2019-02-25 DIAGNOSIS — Z98.49 CATARACT EXTRACTION STATUS, UNSPECIFIED EYE: Chronic | ICD-10-CM

## 2019-02-25 PROCEDURE — 67113 REPAIR RETINAL DETACH CPLX: CPT | Mod: RT

## 2019-02-25 PROCEDURE — C1889: CPT

## 2019-02-25 NOTE — ASU DISCHARGE PLAN (ADULT/PEDIATRIC). - PROCEDURE
Right Eye Pars Plana Vitrectomy, Membrane Peel, Injection of C3F8 Gas Right Eye Pars Plana Vitrectomy, Internal Limiting Membrane Peel, Injection of C3F8 Gas

## 2019-02-25 NOTE — ASU DISCHARGE PLAN (ADULT/PEDIATRIC). - PT EDUC
other (specify)/Eye shield with instructions , sunglasses and eye kit given to patient. Gas bubble instructions reviewed with good understanding ,gas bubble bracelet on pt.

## 2019-04-01 ENCOUNTER — TRANSCRIPTION ENCOUNTER (OUTPATIENT)
Age: 64
End: 2019-04-01

## 2019-07-25 ENCOUNTER — RX RENEWAL (OUTPATIENT)
Age: 64
End: 2019-07-25

## 2019-08-03 PROBLEM — D18.09 HEMANGIOMA OF OTHER SITES: Chronic | Status: ACTIVE | Noted: 2019-02-25

## 2019-08-03 PROBLEM — Q85.8 OTHER PHAKOMATOSES, NOT ELSEWHERE CLASSIFIED: Chronic | Status: ACTIVE | Noted: 2019-02-25

## 2019-08-03 PROBLEM — I10 ESSENTIAL (PRIMARY) HYPERTENSION: Chronic | Status: ACTIVE | Noted: 2019-02-25

## 2019-08-03 PROBLEM — E78.00 PURE HYPERCHOLESTEROLEMIA, UNSPECIFIED: Chronic | Status: ACTIVE | Noted: 2019-02-25

## 2019-08-03 PROBLEM — C64.9 MALIGNANT NEOPLASM OF UNSPECIFIED KIDNEY, EXCEPT RENAL PELVIS: Chronic | Status: ACTIVE | Noted: 2019-02-25

## 2019-08-07 ENCOUNTER — APPOINTMENT (OUTPATIENT)
Dept: INTERNAL MEDICINE | Facility: CLINIC | Age: 64
End: 2019-08-07
Payer: COMMERCIAL

## 2019-08-07 PROCEDURE — 36415 COLL VENOUS BLD VENIPUNCTURE: CPT

## 2019-08-09 LAB
HBV SURFACE AB SER QL: NONREACTIVE
MEV IGG FLD QL IA: >300 AU/ML
MEV IGG+IGM SER-IMP: POSITIVE
MUV AB SER-ACNC: POSITIVE
MUV IGG SER QL IA: >300 AU/ML
RUBV IGG FLD-ACNC: >33 INDEX
RUBV IGG SER-IMP: POSITIVE
SAVE SPECIMEN: NORMAL
VZV AB TITR SER: POSITIVE
VZV IGG SER IF-ACNC: 2365 INDEX

## 2019-08-12 ENCOUNTER — TRANSCRIPTION ENCOUNTER (OUTPATIENT)
Age: 64
End: 2019-08-12

## 2019-08-13 ENCOUNTER — LABORATORY RESULT (OUTPATIENT)
Age: 64
End: 2019-08-13

## 2019-08-13 LAB — HEPATITIS A IGG ANTIBODY: REACTIVE

## 2019-08-14 ENCOUNTER — APPOINTMENT (OUTPATIENT)
Dept: INTERNAL MEDICINE | Facility: CLINIC | Age: 64
End: 2019-08-14

## 2019-09-06 ENCOUNTER — APPOINTMENT (OUTPATIENT)
Dept: INTERNAL MEDICINE | Facility: CLINIC | Age: 64
End: 2019-09-06
Payer: COMMERCIAL

## 2019-09-06 VITALS — DIASTOLIC BLOOD PRESSURE: 80 MMHG | SYSTOLIC BLOOD PRESSURE: 130 MMHG | TEMPERATURE: 98.2 F

## 2019-09-06 VITALS — BODY MASS INDEX: 23.22 KG/M2 | WEIGHT: 123 LBS | HEIGHT: 61 IN

## 2019-09-06 DIAGNOSIS — Z87.09 PERSONAL HISTORY OF OTHER DISEASES OF THE RESPIRATORY SYSTEM: ICD-10-CM

## 2019-09-06 PROCEDURE — 99213 OFFICE O/P EST LOW 20 MIN: CPT

## 2019-09-06 NOTE — HISTORY OF PRESENT ILLNESS
[FreeTextEntry8] : This is a 64-year-old female with von Hippel-Lindau syndrome who is on a protocol.\par \par She just returned from an extensive trip to [place] and [place]. She states that she was coughed on in the airplane and approximately 3 days ago developed an upper respiratory tract infection. This has now been complicated by profound coughing.\par \par She is limited in her medications because of her experimental protocol

## 2019-09-06 NOTE — ASSESSMENT
[FreeTextEntry1] : this is a 64-year-old female whose history has been reviewed above\par \par Patient has rales at the right base with occasional wheeze. She will be prescribed Zithromax in keeping with her experimental protocol\par \par She does have some wheezing which is mild at this point I would defer specific therapy she can take over-the-counter cough medicines and Chloraseptic\par \par

## 2019-09-06 NOTE — PHYSICAL EXAM
[No Acute Distress] : no acute distress [Well Nourished] : well nourished [No JVD] : no jugular venous distention [Normal Sclera/Conjunctiva] : normal sclera/conjunctiva [No Lymphadenopathy] : no lymphadenopathy [Normal] : no joint swelling and grossly normal strength and tone [de-identified] : mild erythema [de-identified] : aint wheezing Rales left lower lobe

## 2019-09-20 ENCOUNTER — TRANSCRIPTION ENCOUNTER (OUTPATIENT)
Age: 64
End: 2019-09-20

## 2019-12-10 PROBLEM — Z87.19 HISTORY OF GASTROENTERITIS: Status: RESOLVED | Noted: 2019-12-10 | Resolved: 2019-12-10

## 2019-12-13 ENCOUNTER — LABORATORY RESULT (OUTPATIENT)
Age: 64
End: 2019-12-13

## 2019-12-13 ENCOUNTER — NON-APPOINTMENT (OUTPATIENT)
Age: 64
End: 2019-12-13

## 2019-12-13 ENCOUNTER — APPOINTMENT (OUTPATIENT)
Dept: INTERNAL MEDICINE | Facility: CLINIC | Age: 64
End: 2019-12-13
Payer: COMMERCIAL

## 2019-12-13 VITALS
SYSTOLIC BLOOD PRESSURE: 150 MMHG | BODY MASS INDEX: 23.79 KG/M2 | WEIGHT: 126 LBS | DIASTOLIC BLOOD PRESSURE: 90 MMHG | HEIGHT: 61 IN

## 2019-12-13 DIAGNOSIS — Z86.69 PERSONAL HISTORY OF OTHER DISEASES OF THE NERVOUS SYSTEM AND SENSE ORGANS: ICD-10-CM

## 2019-12-13 DIAGNOSIS — Z87.19 PERSONAL HISTORY OF OTHER DISEASES OF THE DIGESTIVE SYSTEM: ICD-10-CM

## 2019-12-13 LAB
BILIRUB UR QL STRIP: NORMAL
CLARITY UR: CLEAR
COLLECTION METHOD: NORMAL
GLUCOSE UR-MCNC: NORMAL
HCG UR QL: 0.2 EU/DL
HGB UR QL STRIP.AUTO: NORMAL
KETONES UR-MCNC: NORMAL
LEUKOCYTE ESTERASE UR QL STRIP: NORMAL
NITRITE UR QL STRIP: NORMAL
PH UR STRIP: 7
PROT UR STRIP-MCNC: NORMAL
SP GR UR STRIP: 1.01

## 2019-12-13 PROCEDURE — 93000 ELECTROCARDIOGRAM COMPLETE: CPT

## 2019-12-13 PROCEDURE — 81003 URINALYSIS AUTO W/O SCOPE: CPT | Mod: QW

## 2019-12-13 PROCEDURE — 36415 COLL VENOUS BLD VENIPUNCTURE: CPT

## 2019-12-13 PROCEDURE — 99396 PREV VISIT EST AGE 40-64: CPT | Mod: 25

## 2019-12-13 RX ORDER — BRIMONIDINE TARTRATE, TIMOLOL MALEATE 2; 5 MG/ML; MG/ML
0.2-0.5 SOLUTION/ DROPS OPHTHALMIC
Qty: 5 | Refills: 0 | Status: DISCONTINUED | COMMUNITY
Start: 2019-03-26 | End: 2019-12-13

## 2019-12-13 RX ORDER — AZITHROMYCIN 250 MG/1
250 TABLET, FILM COATED ORAL
Qty: 1 | Refills: 0 | Status: DISCONTINUED | COMMUNITY
Start: 2019-09-06 | End: 2019-12-13

## 2019-12-13 NOTE — HEALTH RISK ASSESSMENT
[Good] : ~his/her~ current health as good [No] : No [No falls in past year] : Patient reported no falls in the past year [Hepatitis C test offered] : Hepatitis C test offered [None] : None [] :  [College] : College [With Significant Other] : lives with significant other [Sexually Active] : sexually active [Fully functional (using the telephone, shopping, preparing meals, housekeeping, doing laundry, using] : Fully functional and needs no help or supervision to perform IADLs (using the telephone, shopping, preparing meals, housekeeping, doing laundry, using transportation, managing medications and managing finances) [Fully functional (bathing, dressing, toileting, transferring, walking, feeding)] : Fully functional (bathing, dressing, toileting, transferring, walking, feeding) [Reports changes in hearing] : Reports changes in hearing [Carbon Monoxide Detector] : carbon monoxide detector [Smoke Detector] : smoke detector [Seat Belt] :  uses seat belt [TB Exposure] : is being exposed to tuberculosis [I will adhere to the patient's wishes as expressed in the advance directive except as noted below.] : I will adhere to the patient's wishes as expressed in the advance directive except as noted below [] : No [Reports changes in dental health] : Reports no changes in dental health [Reports changes in vision] : Reports no changes in vision [Change in mental status noted] : No change in mental status noted [Sunscreen] : does not use sunscreen [Safety elements used in home] : no safety elements used in home [Guns at Home] : no guns at home [Travel to Developing Areas] : does not  travel to developing areas [Caregiver Concerns] : does not have caregiver concerns [FreeTextEntry4] :  is healthcare proxy

## 2019-12-13 NOTE — ASSESSMENT
[FreeTextEntry1] : This is a complex 64-year-old female with history has been reviewed above\par \par She has von Hippel-Lindau. She has renal masses and retinopathy both have improved on her protocol which is a VEGF inhibitor. She does followup with urology and ophthalmology\par \par She has some mild dyspnea on exertion this is chronic no intervention. She states that she does have some anemia which is consistent with her therapy\par \par She has a history of hypertension blood pressure is quite labile at times but seems to be sustained at a level of both cold we will increase her antihypertensive medications gently. She will discuss them with her protocol physicians\par \par She remains on a statin for hypercholesterolemia cholesterol profile has been obtained medication changes predicated on the results\par \par She has a history of a colonic polyp she had a recent colonoscopy which was clean\par \par She is due for mammography and bone density\par \par He did get a flu shot

## 2019-12-13 NOTE — PHYSICAL EXAM
[No Acute Distress] : no acute distress [Well Nourished] : well nourished [Well Developed] : well developed [Well-Appearing] : well-appearing [PERRL] : pupils equal round and reactive to light [Normal Sclera/Conjunctiva] : normal sclera/conjunctiva [EOMI] : extraocular movements intact [Normal Outer Ear/Nose] : the outer ears and nose were normal in appearance [Normal Oropharynx] : the oropharynx was normal [No JVD] : no jugular venous distention [No Lymphadenopathy] : no lymphadenopathy [Supple] : supple [Thyroid Normal, No Nodules] : the thyroid was normal and there were no nodules present [No Respiratory Distress] : no respiratory distress  [No Accessory Muscle Use] : no accessory muscle use [Clear to Auscultation] : lungs were clear to auscultation bilaterally [Normal Rate] : normal rate  [Regular Rhythm] : with a regular rhythm [Normal S1, S2] : normal S1 and S2 [No Murmur] : no murmur heard [No Carotid Bruits] : no carotid bruits [No Abdominal Bruit] : a ~M bruit was not heard ~T in the abdomen [No Varicosities] : no varicosities [Pedal Pulses Present] : the pedal pulses are present [No Palpable Aorta] : no palpable aorta [No Edema] : there was no peripheral edema [No Extremity Clubbing/Cyanosis] : no extremity clubbing/cyanosis [Normal Appearance] : normal in appearance [No Axillary Lymphadenopathy] : no axillary lymphadenopathy [Soft] : abdomen soft [Non Tender] : non-tender [Non-distended] : non-distended [No Masses] : no abdominal mass palpated [No HSM] : no HSM [Normal Bowel Sounds] : normal bowel sounds [Normal Sphincter Tone] : normal sphincter tone [No Mass] : no mass [Normal Posterior Cervical Nodes] : no posterior cervical lymphadenopathy [Normal Anterior Cervical Nodes] : no anterior cervical lymphadenopathy [No CVA Tenderness] : no CVA  tenderness [No Spinal Tenderness] : no spinal tenderness [Grossly Normal Strength/Tone] : grossly normal strength/tone [No Joint Swelling] : no joint swelling [No Rash] : no rash [Coordination Grossly Intact] : coordination grossly intact [Normal Gait] : normal gait [No Focal Deficits] : no focal deficits [Normal Affect] : the affect was normal [Deep Tendon Reflexes (DTR)] : deep tendon reflexes were 2+ and symmetric [Normal Insight/Judgement] : insight and judgment were intact [Stool Occult Blood] : stool negative for occult blood

## 2019-12-13 NOTE — REVIEW OF SYSTEMS
[Hearing Loss] : hearing loss [Negative] : Psychiatric [FreeTextEntry6] : history [FreeTextEntry7] : history

## 2019-12-16 LAB
25(OH)D3 SERPL-MCNC: 40.8 NG/ML
ALBUMIN SERPL ELPH-MCNC: 4.6 G/DL
ALP BLD-CCNC: 70 U/L
ALT SERPL-CCNC: 19 U/L
ANION GAP SERPL CALC-SCNC: 12 MMOL/L
AST SERPL-CCNC: 23 U/L
BASOPHILS # BLD AUTO: 0.06 K/UL
BASOPHILS NFR BLD AUTO: 1 %
BILIRUB SERPL-MCNC: 0.3 MG/DL
BUN SERPL-MCNC: 31 MG/DL
CALCIUM SERPL-MCNC: 10 MG/DL
CHLORIDE SERPL-SCNC: 96 MMOL/L
CHOLEST SERPL-MCNC: 181 MG/DL
CHOLEST/HDLC SERPL: 3.4 RATIO
CO2 SERPL-SCNC: 30 MMOL/L
CREAT SERPL-MCNC: 1.14 MG/DL
EOSINOPHIL # BLD AUTO: 0.17 K/UL
EOSINOPHIL NFR BLD AUTO: 2.7 %
ESTIMATED AVERAGE GLUCOSE: 114 MG/DL
GLUCOSE SERPL-MCNC: 94 MG/DL
HBA1C MFR BLD HPLC: 5.6 %
HCT VFR BLD CALC: 30 %
HCV AB SER QL: NONREACTIVE
HCV S/CO RATIO: 0.33 S/CO
HDLC SERPL-MCNC: 54 MG/DL
HGB BLD-MCNC: 9.6 G/DL
IMM GRANULOCYTES NFR BLD AUTO: 0.3 %
LDLC SERPL CALC-MCNC: 98 MG/DL
LYMPHOCYTES # BLD AUTO: 1.3 K/UL
LYMPHOCYTES NFR BLD AUTO: 20.6 %
MAN DIFF?: NORMAL
MCHC RBC-ENTMCNC: 31 PG
MCHC RBC-ENTMCNC: 32 GM/DL
MCV RBC AUTO: 96.8 FL
MONOCYTES # BLD AUTO: 0.58 K/UL
MONOCYTES NFR BLD AUTO: 9.2 %
NEUTROPHILS # BLD AUTO: 4.18 K/UL
NEUTROPHILS NFR BLD AUTO: 66.2 %
PLATELET # BLD AUTO: 263 K/UL
POTASSIUM SERPL-SCNC: 4.6 MMOL/L
PROT SERPL-MCNC: 6.9 G/DL
RBC # BLD: 3.1 M/UL
RBC # FLD: 12 %
SAVE SPECIMEN: NORMAL
SODIUM SERPL-SCNC: 138 MMOL/L
T3RU NFR SERPL: 1 TBI
T4 SERPL-MCNC: 6.8 UG/DL
TRIGL SERPL-MCNC: 145 MG/DL
TSH SERPL-ACNC: 3.77 UIU/ML
URATE SERPL-MCNC: 5.5 MG/DL
WBC # FLD AUTO: 6.31 K/UL

## 2019-12-19 LAB
FERRITIN SERPL-MCNC: 91 NG/ML
FOLATE SERPL-MCNC: >20 NG/ML
IRON SATN MFR SERPL: 25 %
IRON SERPL-MCNC: 58 UG/DL
TIBC SERPL-MCNC: 233 UG/DL
UIBC SERPL-MCNC: 175 UG/DL
VIT B12 SERPL-MCNC: 672 PG/ML

## 2019-12-20 LAB
ALBUMIN MFR SERPL ELPH: 62.1 %
ALBUMIN SERPL-MCNC: 4.5 G/DL
ALBUMIN/GLOB SERPL: 1.7 RATIO
ALPHA1 GLOB MFR SERPL ELPH: 4.2 %
ALPHA1 GLOB SERPL ELPH-MCNC: 0.3 G/DL
ALPHA2 GLOB MFR SERPL ELPH: 10.4 %
ALPHA2 GLOB SERPL ELPH-MCNC: 0.7 G/DL
B-GLOBULIN MFR SERPL ELPH: 9.7 %
B-GLOBULIN SERPL ELPH-MCNC: 0.7 G/DL
GAMMA GLOB FLD ELPH-MCNC: 1 G/DL
GAMMA GLOB MFR SERPL ELPH: 13.6 %
INTERPRETATION SERPL IEP-IMP: NORMAL
M PROTEIN SPEC IFE-MCNC: NORMAL
PROT SERPL-MCNC: 7.2 G/DL
PROT SERPL-MCNC: 7.2 G/DL

## 2019-12-23 LAB — STFR SERPL-MCNC: 1.8 MG/L

## 2020-01-23 ENCOUNTER — RX RENEWAL (OUTPATIENT)
Age: 65
End: 2020-01-23

## 2020-03-22 ENCOUNTER — RX RENEWAL (OUTPATIENT)
Age: 65
End: 2020-03-22

## 2020-03-25 ENCOUNTER — APPOINTMENT (OUTPATIENT)
Dept: INTERNAL MEDICINE | Facility: CLINIC | Age: 65
End: 2020-03-25

## 2020-04-14 ENCOUNTER — APPOINTMENT (OUTPATIENT)
Dept: DISASTER EMERGENCY | Facility: CLINIC | Age: 65
End: 2020-04-14
Payer: COMMERCIAL

## 2020-04-14 VITALS
RESPIRATION RATE: 14 BRPM | SYSTOLIC BLOOD PRESSURE: 120 MMHG | OXYGEN SATURATION: 99 % | HEART RATE: 88 BPM | DIASTOLIC BLOOD PRESSURE: 78 MMHG | TEMPERATURE: 97.6 F

## 2020-04-14 DIAGNOSIS — U07.1 COVID-19: ICD-10-CM

## 2020-04-14 PROCEDURE — 99213 OFFICE O/P EST LOW 20 MIN: CPT

## 2020-04-14 NOTE — HISTORY OF PRESENT ILLNESS
[Patient presents to the office today for COVID-19 evaluation and testing.] : Patient presents to the office today for COVID-19 evaluation and testing. [Patient has been pre-screened by RN at call center for appointment today with our facility.] : Patient has been pre-screened by RN at call center for appointment today with our facility. [] : mild cough [With Confirmed Case] : patient exposed to a confirmed case of COVID-19 [Immune suppression] : immune suppression [None] : none [Clear] : clear [Speaks in full sentences] : speaks in full sentences [Normal O2 sat at rest] : normal O2 sat at rest [Grossly normal, interacts, not tired or weak] : grossly normal, interacts, not tired or weak [COVID-19 testing ordered and specimen obtained] : COVID-19 testing ordered and specimen obtained [Discharged with current Quarantine instructions and advised of signs of worsening illness.] : Patient discharged with current quarantine instructions and advised of signs of worsening illness. Patient told to seek emergent care if symptoms occur.

## 2020-04-15 ENCOUNTER — APPOINTMENT (OUTPATIENT)
Dept: INTERNAL MEDICINE | Facility: CLINIC | Age: 65
End: 2020-04-15

## 2020-04-15 LAB — SARS-COV-2 N GENE NPH QL NAA+PROBE: NOT DETECTED

## 2020-04-17 LAB
ALBUMIN SERPL ELPH-MCNC: 4.6 G/DL
ALP BLD-CCNC: 80 U/L
ALT SERPL-CCNC: 16 U/L
AMYLASE/CREAT SERPL: 113 U/L
ANION GAP SERPL CALC-SCNC: 17 MMOL/L
AST SERPL-CCNC: 20 U/L
BASOPHILS # BLD AUTO: 0.09 K/UL
BASOPHILS NFR BLD AUTO: 0.9 %
BILIRUB SERPL-MCNC: 0.2 MG/DL
BUN SERPL-MCNC: 26 MG/DL
CALCIUM SERPL-MCNC: 9.9 MG/DL
CHLORIDE SERPL-SCNC: 92 MMOL/L
CO2 SERPL-SCNC: 28 MMOL/L
CREAT SERPL-MCNC: 1.14 MG/DL
EOSINOPHIL # BLD AUTO: 0.26 K/UL
EOSINOPHIL NFR BLD AUTO: 2.7 %
FERRITIN SERPL-MCNC: 113 NG/ML
FOLATE SERPL-MCNC: >20 NG/ML
GLUCOSE SERPL-MCNC: 96 MG/DL
HCT VFR BLD CALC: 32.4 %
HGB BLD-MCNC: 10.2 G/DL
IMM GRANULOCYTES NFR BLD AUTO: 0.4 %
IRON SATN MFR SERPL: 28 %
IRON SERPL-MCNC: 66 UG/DL
LPL SERPL-CCNC: 40 U/L
LYMPHOCYTES # BLD AUTO: 2.32 K/UL
LYMPHOCYTES NFR BLD AUTO: 24.1 %
MAGNESIUM SERPL-MCNC: 1.9 MG/DL
MAN DIFF?: NORMAL
MCHC RBC-ENTMCNC: 30.5 PG
MCHC RBC-ENTMCNC: 31.5 GM/DL
MCV RBC AUTO: 97 FL
MONOCYTES # BLD AUTO: 0.73 K/UL
MONOCYTES NFR BLD AUTO: 7.6 %
NEUTROPHILS # BLD AUTO: 6.17 K/UL
NEUTROPHILS NFR BLD AUTO: 64.3 %
PHOSPHATE SERPL-MCNC: 4 MG/DL
PLATELET # BLD AUTO: 360 K/UL
POTASSIUM SERPL-SCNC: 3.9 MMOL/L
PROT SERPL-MCNC: 7.5 G/DL
RBC # BLD: 3.34 M/UL
RBC # BLD: 3.34 M/UL
RBC # FLD: 11.6 %
RETICS # AUTO: 1.2 %
RETICS AGGREG/RBC NFR: 41.4 K/UL
SAVE SPECIMEN: NORMAL
SODIUM SERPL-SCNC: 138 MMOL/L
TIBC SERPL-MCNC: 239 UG/DL
UIBC SERPL-MCNC: 173 UG/DL
VIT B12 SERPL-MCNC: 746 PG/ML
WBC # FLD AUTO: 9.61 K/UL

## 2020-04-20 LAB — STFR SERPL-MCNC: 8.9 NMOL/L

## 2020-05-05 ENCOUNTER — APPOINTMENT (OUTPATIENT)
Dept: INTERNAL MEDICINE | Facility: CLINIC | Age: 65
End: 2020-05-05
Payer: MEDICARE

## 2020-05-05 DIAGNOSIS — D18.09 HEMANGIOMA OF OTHER SITES: ICD-10-CM

## 2020-05-05 PROCEDURE — 99214 OFFICE O/P EST MOD 30 MIN: CPT | Mod: 95

## 2020-05-05 NOTE — HISTORY OF PRESENT ILLNESS
[Home] : at home, [unfilled] , at the time of the visit. [Medical Office: ([facility])___] : at the medical office located in  [Patient] : the patient [FreeTextEntry1] : This is a 65-year-old female for evaluation and treatment of her hypertension her VHL disease retinopathy renal masses and intermittent shortness of [de-identified] : Patient is feeling quite well she continues on experimental protocol. I have been in contact with the people at [facility]

## 2020-05-05 NOTE — ASSESSMENT
[FreeTextEntry1] : This is a patient who is doing remarkably well on experimental protocol I do believe it is a VEGF inhibitor.\par \par We did review her various tests her MRI of the abdomen was remarkable with resolution of masses and cysts\par \par She was seen by ophthalmology who said a hemangioma of the retina also resolved\par \par Her blood pressure was taken at prior to testing. She is reluctant to take her blood pressure on her own. Her blood pressure was 120/72 we will continue her current medications\par \par Her laboratory is remarkable for mild anemia. This was discussed with the research group and it is consistent with the medication no intervention\par \par Her dyspnea on exertion is stable and intermittent. She occasionally gets short of breath walking upstairs but there has been no progression and this has been the same for years no intervention\par \par I will see her in followup in 3 months

## 2020-05-25 ENCOUNTER — TRANSCRIPTION ENCOUNTER (OUTPATIENT)
Age: 65
End: 2020-05-25

## 2020-06-01 ENCOUNTER — APPOINTMENT (OUTPATIENT)
Dept: INTERNAL MEDICINE | Facility: CLINIC | Age: 65
End: 2020-06-01
Payer: MEDICARE

## 2020-06-01 DIAGNOSIS — M25.541 PAIN IN JOINTS OF RIGHT HAND: ICD-10-CM

## 2020-06-01 PROCEDURE — 99213 OFFICE O/P EST LOW 20 MIN: CPT | Mod: 95

## 2020-06-01 NOTE — HISTORY OF PRESENT ILLNESS
[Home] : at home, [unfilled] , at the time of the visit. [Medical Office: ([facility])___] : at the medical office located in  [Verbal consent obtained from patient] : the patient, [unfilled] [FreeTextEntry8] : This is a 65-year-old female with one major problem and one minor problem.\par \par She slipped approximately 12 days ago and hurt the hyperthenar eminence of the right hand she is a  and needs this to work despite local therapy she continues to have pain\par \par She has no ecchymosis she did go for his med which x-rayed her and said that she had no fracture despite that she has continued pain which isn't current with work\par \par In addition she has tinnitus she was prescribed Flonase which he discontinued his daughter was helping and is concerned about long-term use

## 2020-06-01 NOTE — ASSESSMENT
[FreeTextEntry1] : This is a 65-year-old white male whose history has been reviewed above\par \par Patient had a traumatic injury she has point tenderness in the hypothenar eminence. This isn't at work she has tried Tylenol gells and warm soaks\par \par I feel that she may need an injection and referred her to a hand surgeon\par \par In terms of her Flonase she can continue this certainly through the season which is what she is asking

## 2020-06-03 ENCOUNTER — APPOINTMENT (OUTPATIENT)
Dept: ORTHOPEDIC SURGERY | Facility: CLINIC | Age: 65
End: 2020-06-03
Payer: MEDICARE

## 2020-06-03 VITALS
DIASTOLIC BLOOD PRESSURE: 93 MMHG | TEMPERATURE: 98.3 F | SYSTOLIC BLOOD PRESSURE: 156 MMHG | WEIGHT: 128 LBS | HEIGHT: 62 IN | HEART RATE: 87 BPM | BODY MASS INDEX: 23.55 KG/M2

## 2020-06-03 DIAGNOSIS — Z84.89 FAMILY HISTORY OF OTHER SPECIFIED CONDITIONS: ICD-10-CM

## 2020-06-03 DIAGNOSIS — Z85.9 PERSONAL HISTORY OF MALIGNANT NEOPLASM, UNSPECIFIED: ICD-10-CM

## 2020-06-03 DIAGNOSIS — Z86.39 PERSONAL HISTORY OF OTHER ENDOCRINE, NUTRITIONAL AND METABOLIC DISEASE: ICD-10-CM

## 2020-06-03 DIAGNOSIS — S60.222A CONTUSION OF LEFT HAND, INITIAL ENCOUNTER: ICD-10-CM

## 2020-06-03 PROCEDURE — 99203 OFFICE O/P NEW LOW 30 MIN: CPT

## 2020-06-03 PROCEDURE — 73130 X-RAY EXAM OF HAND: CPT | Mod: 26,LT

## 2020-06-03 PROCEDURE — 73110 X-RAY EXAM OF WRIST: CPT | Mod: 26,LT

## 2020-06-03 NOTE — CONSULT LETTER
[Dear  ___] : Dear  [unfilled], [Consult Letter:] : I had the pleasure of evaluating your patient, [unfilled]. [Please see my note below.] : Please see my note below. [Consult Closing:] : Thank you very much for allowing me to participate in the care of this patient.  If you have any questions, please do not hesitate to contact me. [Sincerely,] : Sincerely, [FreeTextEntry3] : Brian Nieto M.D.\par Surgery of the Hand & Upper Extremity\par Orthopaedic Surgery\par Chief, Hand Service, [facility]\par Director, Hand Service, [facility]\par  of Orthopedic Surgery, [facility] at [facility] \par [facility]\par \par Email: [email or web address]\par Office Phone: [phone]\par

## 2020-06-03 NOTE — PHYSICAL EXAM
[de-identified] : - Constitutional: This is a female in no obvious distress.  \par - Psych: Patient is alert and oriented to person, place and time.  Patient has a normal mood and affect.\par - Cardiovascular: Normal pulses throughout the upper extremities.  No significant varicosities are noted in the upper extremities. \par - Neuro: Strength and sensation are intact throughout the upper extremities.  Patient has normal coordination.\par - Respiratory:  Patient exhibits no evidence of shortness of breath or difficulty breathing.\par - Skin: No rashes, lesions, or other abnormalities are noted in the upper extremities.\par \par ---\par \par Examination of her left wrist and hand demonstrates healing abrasions along the palm.  There is no evidence of an infection.  She has point tenderness along the palmar aspect of the thumb at the CMC joint and thenar muscles.  There is no tenderness along the scaphoid tubercle.  There is no swelling or tenderness along the snuffbox or distal radius or MCP joint of the thumb.  There is no tenderness dorsally along the CMC joint of the thumb.  She is neurovascularly intact distally. [de-identified] : I reviewed AP, lateral oblique radiographs of her left wrist and hand from 9 days ago.  These demonstrate no acute fractures or dislocations.  There is evidence of minimal degenerative changes at the CMC joint of the thumb.

## 2020-06-03 NOTE — HISTORY OF PRESENT ILLNESS
[Right] : right hand dominant [FreeTextEntry1] : She comes in today for evaluation of injury to her left wrist and hand.\par \par She 2 weeks ago.  She has noted pain at the base of her thumb.  She denies other injuries.  She did have x-rays 9 days ago.  She is typically symptomatic when she is working on the computer.  She works as a .  She is on the computer most of the day.  She recently started to take 400 mg of Advil before she works and has noted significant improvement.\par \par She was referred by Dr. Sauceda.

## 2020-06-03 NOTE — DISCUSSION/SUMMARY
[FreeTextEntry1] : She has findings consistent with left hand contusion in the thenar region and possible CMC joint sprain.  There is no evidence of an acute fracture.\par \par I had a discussion regarding today's visit, the diagnosis, and treatment recommendations / options.  Symptomatic treatment was discussed.  She has been taking Advil 400 mg before she works and this is helped her symptoms.  She will continue this for the next week.  She was also instructed on the use of ice and warm compresses.  She will follow-up in 2 to 4 weeks if needed, according to her symptoms.\par \par The patient has agreed to this plan of management and has expressed full understanding.  All questions were fully answered to the patient's satisfaction.\par \par Over 50% of the time spent with the patient was on counseling the patient on the above diagnosis, treatment plan and prognosis.

## 2020-06-18 ENCOUNTER — RX RENEWAL (OUTPATIENT)
Age: 65
End: 2020-06-18

## 2020-07-21 ENCOUNTER — RX RENEWAL (OUTPATIENT)
Age: 65
End: 2020-07-21

## 2020-08-09 ENCOUNTER — TRANSCRIPTION ENCOUNTER (OUTPATIENT)
Age: 65
End: 2020-08-09

## 2020-08-12 ENCOUNTER — APPOINTMENT (OUTPATIENT)
Dept: INTERNAL MEDICINE | Facility: CLINIC | Age: 65
End: 2020-08-12
Payer: MEDICARE

## 2020-08-12 VITALS
HEIGHT: 61 IN | DIASTOLIC BLOOD PRESSURE: 80 MMHG | WEIGHT: 128 LBS | SYSTOLIC BLOOD PRESSURE: 158 MMHG | TEMPERATURE: 98 F | BODY MASS INDEX: 24.17 KG/M2

## 2020-08-12 VITALS — DIASTOLIC BLOOD PRESSURE: 88 MMHG | SYSTOLIC BLOOD PRESSURE: 150 MMHG

## 2020-08-12 PROCEDURE — 99214 OFFICE O/P EST MOD 30 MIN: CPT

## 2020-08-12 NOTE — ASSESSMENT
[FreeTextEntry1] : This is a 65-year-old female whose history has been reviewed above\par \par Her blood pressure indeed is significantly elevated we will add amlodipine 2.5 she will check this with her protocol physicians.\par \par In terms of her urinary frequency it is interesting that it really only occurs at night I told her to cut back on the frequent fluids that she has after dinner coffee alcohol etc.\par \par The area that she was concerned about on her abdomen is a small incisional hernia\par \par It is possible that she may have just some local irritation after her vacation

## 2020-08-12 NOTE — PHYSICAL EXAM
[No CVA Tenderness] : no CVA  tenderness [Normal] : normal rate, regular rhythm, normal S1 and S2 and no murmur heard

## 2020-09-14 ENCOUNTER — RX RENEWAL (OUTPATIENT)
Age: 65
End: 2020-09-14

## 2020-09-17 ENCOUNTER — RX RENEWAL (OUTPATIENT)
Age: 65
End: 2020-09-17

## 2020-09-21 ENCOUNTER — APPOINTMENT (OUTPATIENT)
Dept: INTERNAL MEDICINE | Facility: CLINIC | Age: 65
End: 2020-09-21

## 2020-09-29 ENCOUNTER — APPOINTMENT (OUTPATIENT)
Dept: INTERNAL MEDICINE | Facility: CLINIC | Age: 65
End: 2020-09-29
Payer: MEDICARE

## 2020-09-29 VITALS
WEIGHT: 127 LBS | DIASTOLIC BLOOD PRESSURE: 82 MMHG | BODY MASS INDEX: 23.98 KG/M2 | TEMPERATURE: 97.9 F | HEIGHT: 61 IN | SYSTOLIC BLOOD PRESSURE: 146 MMHG

## 2020-09-29 DIAGNOSIS — Z23 ENCOUNTER FOR IMMUNIZATION: ICD-10-CM

## 2020-09-29 PROCEDURE — 90662 IIV NO PRSV INCREASED AG IM: CPT

## 2020-09-29 PROCEDURE — G0008: CPT

## 2020-09-29 PROCEDURE — 99214 OFFICE O/P EST MOD 30 MIN: CPT | Mod: 25

## 2020-09-29 NOTE — ASSESSMENT
[FreeTextEntry1] : This is a complex 65-year-old female whose history has been reviewed above\par \par Her blood pressure indeed has increased above goal. However she has gained weight\par \par She is anxious to simplify her medications however I told her that by increasing certain medicines side effects would increase in addition beta blockers are not the best medications for prevention of cardiovascular disease. What we will do is increase her amlodipine to 5 mg. I will recheck her in about 6 weeks\par \par We will also give her a Pneumovax and a flu shot because of her underlying disease we will increase her to the high-dose flu shot. In addition I will ask to get the Pneumovax at any somewhat but her day to avoid any confusion about reaction\par \par We also discussed the relationship of weight and blood pressure she will attempt to lose 5 pounds

## 2020-09-29 NOTE — PHYSICAL EXAM
[No JVD] : no jugular venous distention [Normal] : normal rate, regular rhythm, normal S1 and S2 and no murmur heard [No Edema] : there was no peripheral edema [No Focal Deficits] : no focal deficits

## 2020-09-29 NOTE — HISTORY OF PRESENT ILLNESS
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 She has seen numerous physicians who have told her that her blood pressure was high and that she had some rectal manifestations of hypertension. She comes in today because of concern over her blood pressure and because the complexity of her hypertensive regime

## 2020-11-17 ENCOUNTER — APPOINTMENT (OUTPATIENT)
Dept: INTERNAL MEDICINE | Facility: CLINIC | Age: 65
End: 2020-11-17

## 2020-12-01 ENCOUNTER — LABORATORY RESULT (OUTPATIENT)
Age: 65
End: 2020-12-01

## 2020-12-01 ENCOUNTER — NON-APPOINTMENT (OUTPATIENT)
Age: 65
End: 2020-12-01

## 2020-12-01 ENCOUNTER — APPOINTMENT (OUTPATIENT)
Dept: INTERNAL MEDICINE | Facility: CLINIC | Age: 65
End: 2020-12-01
Payer: MEDICARE

## 2020-12-01 VITALS
BODY MASS INDEX: 24.54 KG/M2 | WEIGHT: 125 LBS | TEMPERATURE: 96.4 F | SYSTOLIC BLOOD PRESSURE: 118 MMHG | DIASTOLIC BLOOD PRESSURE: 88 MMHG | HEIGHT: 60 IN

## 2020-12-01 VITALS — DIASTOLIC BLOOD PRESSURE: 88 MMHG | SYSTOLIC BLOOD PRESSURE: 138 MMHG

## 2020-12-01 PROCEDURE — 36415 COLL VENOUS BLD VENIPUNCTURE: CPT

## 2020-12-01 PROCEDURE — 90732 PPSV23 VACC 2 YRS+ SUBQ/IM: CPT

## 2020-12-01 PROCEDURE — G0009: CPT

## 2020-12-01 PROCEDURE — G0438: CPT

## 2020-12-01 PROCEDURE — G0402 INITIAL PREVENTIVE EXAM: CPT

## 2020-12-01 PROCEDURE — 93000 ELECTROCARDIOGRAM COMPLETE: CPT

## 2020-12-01 PROCEDURE — 99213 OFFICE O/P EST LOW 20 MIN: CPT | Mod: 25

## 2020-12-01 PROCEDURE — G0403: CPT

## 2020-12-01 NOTE — ASSESSMENT
[FreeTextEntry1] : This is a 65-year-old female whose history has been reviewed above\par \par She has a history of hypertension she has lost weight and exercise the blood pressure is just about at goal we will continue her current medications\par \par She has a history of von Hippel-Lindau disease she is on protocol she has several renal lesions that had been drinking she has had cerebral angiomas which have shrunk she continues to follow up\par \par She has a history of hypercholesterolemia she remains on a statin cholesterol profiles obtain medication changes predicated on the results\par \par Currently she is asymptomatic and doing well she follows up at the [facility] in terms of all of her lesions\par \par She is up-to-date with colonoscopy she did have a tubular adenoma in the past\par \par She is up-to-date with mammography she will see OB/GYN. We will order a bone density\par \par She did get a flu shot we will give her a pneumonia shot

## 2020-12-01 NOTE — HEALTH RISK ASSESSMENT
[Good] : ~his/her~  mood as  good [Yes] : Yes [No falls in past year] : Patient reported no falls in the past year [0] : 1) Little interest or pleasure doing things: Not at all (0) [Hepatitis C test offered] : Hepatitis C test offered [None] : None [With Significant Other] : lives with significant other [College] : College [] :  [Sexually Active] : sexually active [Fully functional (bathing, dressing, toileting, transferring, walking, feeding)] : Fully functional (bathing, dressing, toileting, transferring, walking, feeding) [Fully functional (using the telephone, shopping, preparing meals, housekeeping, doing laundry, using] : Fully functional and needs no help or supervision to perform IADLs (using the telephone, shopping, preparing meals, housekeeping, doing laundry, using transportation, managing medications and managing finances) [Reports changes in vision] : Reports changes in vision [Smoke Detector] : smoke detector [Carbon Monoxide Detector] : carbon monoxide detector [Seat Belt] :  uses seat belt [Sunscreen] : uses sunscreen [I will adhere to the patient's wishes as expressed in the advance directive except as noted below.] : I will adhere to the patient's wishes as expressed in the advance directive except as noted below [] : No [Change in mental status noted] : No change in mental status noted [High Risk Behavior] : no high risk behavior [Reports changes in dental health] : Reports no changes in dental health [Guns at Home] : no guns at home [Safety elements used in home] : no safety elements used in home [Travel to Developing Areas] : does not  travel to developing areas [TB Exposure] : is not being exposed to tuberculosis [Caregiver Concerns] : does not have caregiver concerns [FreeTextEntry4] :  is healthcare proxy

## 2020-12-01 NOTE — HISTORY OF PRESENT ILLNESS
[FreeTextEntry1] : This is a delightful 65-year-old female for annual health assessment. 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

## 2020-12-02 LAB
25(OH)D3 SERPL-MCNC: 48.9 NG/ML
ALBUMIN SERPL ELPH-MCNC: 4.8 G/DL
ALP BLD-CCNC: 76 U/L
ALT SERPL-CCNC: 15 U/L
ANION GAP SERPL CALC-SCNC: 13 MMOL/L
APPEARANCE: CLEAR
AST SERPL-CCNC: 23 U/L
BASOPHILS # BLD AUTO: 0.06 K/UL
BASOPHILS NFR BLD AUTO: 0.9 %
BILIRUB SERPL-MCNC: 0.2 MG/DL
BILIRUBIN URINE: NEGATIVE
BLOOD URINE: NEGATIVE
BUN SERPL-MCNC: 28 MG/DL
CALCIUM SERPL-MCNC: 10 MG/DL
CHLORIDE SERPL-SCNC: 95 MMOL/L
CHOLEST SERPL-MCNC: 199 MG/DL
CO2 SERPL-SCNC: 28 MMOL/L
COLOR: NORMAL
CREAT SERPL-MCNC: 1.18 MG/DL
EOSINOPHIL # BLD AUTO: 0.06 K/UL
EOSINOPHIL NFR BLD AUTO: 0.9 %
ESTIMATED AVERAGE GLUCOSE: 126 MG/DL
GLUCOSE QUALITATIVE U: NEGATIVE
GLUCOSE SERPL-MCNC: 108 MG/DL
HBA1C MFR BLD HPLC: 6 %
HCT VFR BLD CALC: 30.3 %
HCV AB SER QL: NONREACTIVE
HCV S/CO RATIO: 0.33 S/CO
HDLC SERPL-MCNC: 56 MG/DL
HGB BLD-MCNC: 9.5 G/DL
IMM GRANULOCYTES NFR BLD AUTO: 0.6 %
KETONES URINE: NEGATIVE
LDLC SERPL CALC-MCNC: 109 MG/DL
LEUKOCYTE ESTERASE URINE: NEGATIVE
LYMPHOCYTES # BLD AUTO: 1.1 K/UL
LYMPHOCYTES NFR BLD AUTO: 15.9 %
MAN DIFF?: NORMAL
MCHC RBC-ENTMCNC: 31 PG
MCHC RBC-ENTMCNC: 31.4 GM/DL
MCV RBC AUTO: 99 FL
MONOCYTES # BLD AUTO: 0.57 K/UL
MONOCYTES NFR BLD AUTO: 8.3 %
NEUTROPHILS # BLD AUTO: 5.07 K/UL
NEUTROPHILS NFR BLD AUTO: 73.4 %
NITRITE URINE: NEGATIVE
NONHDLC SERPL-MCNC: 143 MG/DL
PH URINE: 7
PLATELET # BLD AUTO: 299 K/UL
POTASSIUM SERPL-SCNC: 4.1 MMOL/L
PROT SERPL-MCNC: 7.5 G/DL
PROTEIN URINE: NORMAL
RBC # BLD: 3.06 M/UL
RBC # FLD: 12.2 %
SAVE SPECIMEN: NORMAL
SODIUM SERPL-SCNC: 136 MMOL/L
SPECIFIC GRAVITY URINE: 1.01
T3RU NFR SERPL: 1.1 TBI
T4 SERPL-MCNC: 6.9 UG/DL
TRIGL SERPL-MCNC: 173 MG/DL
TSH SERPL-ACNC: 3.12 UIU/ML
URATE SERPL-MCNC: 5.9 MG/DL
UROBILINOGEN URINE: NORMAL
WBC # FLD AUTO: 6.9 K/UL

## 2020-12-04 ENCOUNTER — TRANSCRIPTION ENCOUNTER (OUTPATIENT)
Age: 65
End: 2020-12-04

## 2020-12-21 PROBLEM — Z87.09 HISTORY OF ACUTE BRONCHITIS: Status: RESOLVED | Noted: 2019-09-06 | Resolved: 2020-12-21

## 2020-12-23 ENCOUNTER — TRANSCRIPTION ENCOUNTER (OUTPATIENT)
Age: 65
End: 2020-12-23

## 2020-12-29 ENCOUNTER — RX RENEWAL (OUTPATIENT)
Age: 65
End: 2020-12-29

## 2021-01-13 ENCOUNTER — LABORATORY RESULT (OUTPATIENT)
Age: 66
End: 2021-01-13

## 2021-02-09 ENCOUNTER — NON-APPOINTMENT (OUTPATIENT)
Age: 66
End: 2021-02-09

## 2021-02-09 ENCOUNTER — APPOINTMENT (OUTPATIENT)
Dept: INTERNAL MEDICINE | Facility: CLINIC | Age: 66
End: 2021-02-09
Payer: MEDICARE

## 2021-02-09 VITALS — DIASTOLIC BLOOD PRESSURE: 80 MMHG | SYSTOLIC BLOOD PRESSURE: 140 MMHG

## 2021-02-09 VITALS — WEIGHT: 123 LBS | HEIGHT: 60 IN | OXYGEN SATURATION: 93 % | BODY MASS INDEX: 24.15 KG/M2 | HEART RATE: 84 BPM

## 2021-02-09 PROCEDURE — 93000 ELECTROCARDIOGRAM COMPLETE: CPT

## 2021-02-09 PROCEDURE — 99214 OFFICE O/P EST MOD 30 MIN: CPT | Mod: 25

## 2021-02-09 NOTE — REVIEW OF SYSTEMS
[Palpitations] : palpitations [Anxiety] : anxiety [Negative] : Constitutional [FreeTextEntry6] : See history

## 2021-02-09 NOTE — PHYSICAL EXAM
[No JVD] : no jugular venous distention [Normal] : normal rate, regular rhythm, normal S1 and S2 and no murmur heard [No Focal Deficits] : no focal deficits [de-identified] : Anxious

## 2021-02-09 NOTE — HISTORY OF PRESENT ILLNESS
[FreeTextEntry8] : This is a she is a 65-year-old female with von Hippel-Lindau syndrome hypertension anxiety who comes in with above variety of complaints including palpitations\par \par In addition she had an episode of profound weakness which was right after her Covid shot\par \par Today she has a feeling that she cannot take in a deep breath\par \par She is also suffering from clogged ears and she is being seen by ENT but she states that she is aware of her heartbeat and this is making her anxious she does not really describe irregularities

## 2021-02-09 NOTE — ASSESSMENT
[FreeTextEntry1] : This is a 65-year-old female whose history has been reviewed above\par \par In terms of her acute issue I do believe this is anxiety.  She is having difficulty taking in a deep breath but on observation she is breathing normally and her lungs are clear her respiratory rate is not increased\par \par She has a description of palpitations.  Her heart sounds are normal she is regular cardiogram is unchanged\par \par In terms of her weakness I believe this was transient and probably reaction to her inoculation\par \par Overall I believe this is anxiety after discussion which was valid she does take a lot of medication she agreed to take mild tranquilizer we will start her on Xanax as needed

## 2021-02-12 RX ORDER — ALPRAZOLAM 0.25 MG/1
0.25 TABLET ORAL TWICE DAILY
Qty: 30 | Refills: 0 | Status: DISCONTINUED | COMMUNITY
Start: 2021-02-09 | End: 2021-02-12

## 2021-03-22 ENCOUNTER — RX RENEWAL (OUTPATIENT)
Age: 66
End: 2021-03-22

## 2021-04-20 ENCOUNTER — LABORATORY RESULT (OUTPATIENT)
Age: 66
End: 2021-04-20

## 2021-04-20 ENCOUNTER — APPOINTMENT (OUTPATIENT)
Dept: INTERNAL MEDICINE | Facility: CLINIC | Age: 66
End: 2021-04-20
Payer: MEDICARE

## 2021-04-20 VITALS
SYSTOLIC BLOOD PRESSURE: 156 MMHG | HEIGHT: 60 IN | BODY MASS INDEX: 24.54 KG/M2 | TEMPERATURE: 98 F | DIASTOLIC BLOOD PRESSURE: 80 MMHG | WEIGHT: 125 LBS

## 2021-04-20 DIAGNOSIS — H90.2 CONDUCTIVE HEARING LOSS, UNSPECIFIED: ICD-10-CM

## 2021-04-20 DIAGNOSIS — Z01.818 ENCOUNTER FOR OTHER PREPROCEDURAL EXAMINATION: ICD-10-CM

## 2021-04-20 PROCEDURE — 36415 COLL VENOUS BLD VENIPUNCTURE: CPT

## 2021-04-20 PROCEDURE — 99204 OFFICE O/P NEW MOD 45 MIN: CPT | Mod: 25

## 2021-04-20 PROCEDURE — 99214 OFFICE O/P EST MOD 30 MIN: CPT | Mod: 25

## 2021-04-21 LAB
25(OH)D3 SERPL-MCNC: 35 NG/ML
ALBUMIN SERPL ELPH-MCNC: 4.9 G/DL
ALP BLD-CCNC: 76 U/L
ALT SERPL-CCNC: 16 U/L
ANION GAP SERPL CALC-SCNC: 14 MMOL/L
AST SERPL-CCNC: 24 U/L
BASOPHILS # BLD AUTO: 0.09 K/UL
BASOPHILS NFR BLD AUTO: 1 %
BILIRUB SERPL-MCNC: 0.2 MG/DL
BUN SERPL-MCNC: 28 MG/DL
CALCIUM SERPL-MCNC: 10.4 MG/DL
CHLORIDE SERPL-SCNC: 93 MMOL/L
CHOLEST SERPL-MCNC: 227 MG/DL
CO2 SERPL-SCNC: 30 MMOL/L
CREAT SERPL-MCNC: 1.06 MG/DL
EOSINOPHIL # BLD AUTO: 0.15 K/UL
EOSINOPHIL NFR BLD AUTO: 1.6 %
ESTIMATED AVERAGE GLUCOSE: 123 MG/DL
GLUCOSE SERPL-MCNC: 97 MG/DL
HBA1C MFR BLD HPLC: 5.9 %
HCT VFR BLD CALC: 30.3 %
HDLC SERPL-MCNC: 55 MG/DL
HGB BLD-MCNC: 9.9 G/DL
IMM GRANULOCYTES NFR BLD AUTO: 0.4 %
LDLC SERPL CALC-MCNC: 124 MG/DL
LYMPHOCYTES # BLD AUTO: 2.11 K/UL
LYMPHOCYTES NFR BLD AUTO: 22.9 %
MAN DIFF?: NORMAL
MCHC RBC-ENTMCNC: 31.2 PG
MCHC RBC-ENTMCNC: 32.7 GM/DL
MCV RBC AUTO: 95.6 FL
MONOCYTES # BLD AUTO: 0.76 K/UL
MONOCYTES NFR BLD AUTO: 8.3 %
NEUTROPHILS # BLD AUTO: 6.06 K/UL
NEUTROPHILS NFR BLD AUTO: 65.8 %
NONHDLC SERPL-MCNC: 172 MG/DL
PLATELET # BLD AUTO: 344 K/UL
POTASSIUM SERPL-SCNC: 3.8 MMOL/L
PROT SERPL-MCNC: 7.6 G/DL
RBC # BLD: 3.17 M/UL
RBC # FLD: 11.5 %
SODIUM SERPL-SCNC: 137 MMOL/L
T3RU NFR SERPL: 1.1 TBI
T4 SERPL-MCNC: 7.5 UG/DL
TRIGL SERPL-MCNC: 239 MG/DL
TSH SERPL-ACNC: 3.44 UIU/ML
URATE SERPL-MCNC: 5 MG/DL
WBC # FLD AUTO: 9.21 K/UL

## 2021-04-21 NOTE — HISTORY OF PRESENT ILLNESS
[No Pertinent Cardiac History] : no history of aortic stenosis, atrial fibrillation, coronary artery disease, recent myocardial infarction, or implantable device/pacemaker [No Pertinent Pulmonary History] : no history of asthma, COPD, sleep apnea, or smoking [No Adverse Anesthesia Reaction] : no adverse anesthesia reaction in self or family member [Chronic Anticoagulation] : no chronic anticoagulation [Chronic Kidney Disease] : no chronic kidney disease [Diabetes] : no diabetes [FreeTextEntry1] : Bilateral myringotomy with tubes [FreeTextEntry2] : 4/22/2021 [FreeTextEntry4] : This is a complex 65-year-old female.  She has von Hippel-Lindau disease.  Hypertension hypercholesterolemia and renal masses and retinopathy\par \par She is currently being treated with a protocol drug which is a VEGF inhibitor.  She does have concordant anemia with this medication\par \par Her review of systems is negative

## 2021-04-21 NOTE — ASSESSMENT
[FreeTextEntry4] : This is a complex 65-year-old female with von Hippel-Lindau disease for preoperative clearance.  She has a history of hypertension her blood pressure is under relatively good control.  She has a history of hypercholesterolemia which is well controlled with a statin\par \par She has renal masses which are followed\par \par She remains on a VEGF inhibitor she has concordant anemia\par \par Review of systems and physical examination are stable pending laboratory no medical contraindications to procedure

## 2021-05-26 NOTE — ED PROVIDER NOTE - FAMILY HISTORY
No lymphadedenopathy No lymphadedenopathy No lymphadedenopathy No lymphadedenopathy No pertinent family history in first degree relatives No lymphadedenopathy No lymphadedenopathy No lymphadedenopathy No lymphadedenopathy No lymphadedenopathy No lymphadedenopathy No lymphadedenopathy No lymphadedenopathy No lymphadedenopathy

## 2021-05-27 NOTE — HISTORY OF PRESENT ILLNESS
[FreeTextEntry8] : This is a complex 65-year-old female with Patito Von Hippel disease. She is on experimental protocol.\par \par She had a recent vacation with her . On coming back she had frequent urination and was seen by a first med who told her she did not have an infection. She has no dysuria\par \par In addition she was told that her blood pressure was elevated\par \par Finally she states that she has a mass in the right lower quadrant in the area of her surgery Yes

## 2021-06-15 ENCOUNTER — RX RENEWAL (OUTPATIENT)
Age: 66
End: 2021-06-15

## 2021-06-18 ENCOUNTER — APPOINTMENT (OUTPATIENT)
Dept: INTERNAL MEDICINE | Facility: CLINIC | Age: 66
End: 2021-06-18
Payer: MEDICARE

## 2021-06-18 ENCOUNTER — LABORATORY RESULT (OUTPATIENT)
Age: 66
End: 2021-06-18

## 2021-06-18 VITALS
TEMPERATURE: 98 F | HEIGHT: 60 IN | BODY MASS INDEX: 23.62 KG/M2 | WEIGHT: 120.31 LBS | SYSTOLIC BLOOD PRESSURE: 142 MMHG | DIASTOLIC BLOOD PRESSURE: 80 MMHG

## 2021-06-18 DIAGNOSIS — F41.9 ANXIETY DISORDER, UNSPECIFIED: ICD-10-CM

## 2021-06-18 PROCEDURE — 36415 COLL VENOUS BLD VENIPUNCTURE: CPT

## 2021-06-18 PROCEDURE — 99214 OFFICE O/P EST MOD 30 MIN: CPT | Mod: 25

## 2021-06-18 RX ORDER — FLUTICASONE PROPIONATE 50 UG/1
50 SPRAY, METERED NASAL DAILY
Refills: 0 | Status: DISCONTINUED | COMMUNITY
Start: 2020-05-05 | End: 2021-06-18

## 2021-06-18 RX ORDER — LORAZEPAM 0.5 MG/1
0.5 TABLET ORAL 3 TIMES DAILY
Qty: 90 | Refills: 0 | Status: DISCONTINUED | COMMUNITY
Start: 2021-02-12 | End: 2021-06-18

## 2021-06-18 NOTE — ASSESSMENT
[FreeTextEntry1] : Is a 66-year-old female whose history has been reviewed above\par \par History of hypertension her blood pressure just about at goal however I will increase her antihypertensive closer to optimal\par \par She was told that she had an abnormal EKG this was emailed to me I reviewed it her cardiogram is stable\par \par She has a history of hypercholesterolemia on a statin a cholesterol profile has been obtained medication changes predicated on the results\par \par History of mild anxiety manifest with the sensation of palpitations but only at rest.  Patient was reassured\par \par Patient continues on experimental medication we will continue to follow her hematocrit which is apparently lowered by this medication if there is any change I will notify her providers\par \par She continues to lose weight and is on an appropriate diet with exercise.\par \par She continues to follow-up with urology in terms of her renal lesions

## 2021-06-18 NOTE — HISTORY OF PRESENT ILLNESS
[FreeTextEntry1] : This is a 66-year-old female for evaluation treatment of her hypertension hypercholesterolemia anxiety von Hippel-Lindau syndrome and occasional palpitations\par Patient remains on experimental VEGF inhibitor with good results [de-identified] : Without cardiovascular complaints.  She has been seen in [place] at her research group there was some concern about her cardiogram.\par \par Patient feels well she still gets occasional palpitations mostly at rest she exercises walking at least 10,000 steps a day without any difficulty.\par \par She is having some difficulty with her ear tubes she thinks that the right ear may have lost the tube

## 2021-06-18 NOTE — REVIEW OF SYSTEMS
[Palpitations] : palpitations [Negative] : Neurological [FreeTextEntry4] : History [de-identified] : Able

## 2021-06-19 LAB
25(OH)D3 SERPL-MCNC: 39 NG/ML
ALBUMIN SERPL ELPH-MCNC: 4.7 G/DL
ALP BLD-CCNC: 67 U/L
ALT SERPL-CCNC: 16 U/L
ANION GAP SERPL CALC-SCNC: 14 MMOL/L
AST SERPL-CCNC: 22 U/L
BASOPHILS # BLD AUTO: 0.06 K/UL
BASOPHILS NFR BLD AUTO: 0.8 %
BILIRUB SERPL-MCNC: 0.2 MG/DL
BUN SERPL-MCNC: 22 MG/DL
CALCIUM SERPL-MCNC: 10 MG/DL
CHLORIDE SERPL-SCNC: 97 MMOL/L
CHOLEST SERPL-MCNC: 160 MG/DL
CO2 SERPL-SCNC: 28 MMOL/L
CREAT SERPL-MCNC: 1.12 MG/DL
EOSINOPHIL # BLD AUTO: 0.1 K/UL
EOSINOPHIL NFR BLD AUTO: 1.4 %
ESTIMATED AVERAGE GLUCOSE: 120 MG/DL
GLUCOSE SERPL-MCNC: 102 MG/DL
HBA1C MFR BLD HPLC: 5.8 %
HCT VFR BLD CALC: 28.2 %
HDLC SERPL-MCNC: 42 MG/DL
HGB BLD-MCNC: 9.3 G/DL
IMM GRANULOCYTES NFR BLD AUTO: 0.4 %
LDLC SERPL CALC-MCNC: 76 MG/DL
LYMPHOCYTES # BLD AUTO: 1.72 K/UL
LYMPHOCYTES NFR BLD AUTO: 23.4 %
MAN DIFF?: NORMAL
MCHC RBC-ENTMCNC: 31.3 PG
MCHC RBC-ENTMCNC: 33 GM/DL
MCV RBC AUTO: 94.9 FL
MONOCYTES # BLD AUTO: 0.48 K/UL
MONOCYTES NFR BLD AUTO: 6.5 %
NEUTROPHILS # BLD AUTO: 4.97 K/UL
NEUTROPHILS NFR BLD AUTO: 67.5 %
NONHDLC SERPL-MCNC: 119 MG/DL
PLATELET # BLD AUTO: 307 K/UL
POTASSIUM SERPL-SCNC: 4.2 MMOL/L
PROT SERPL-MCNC: 7.1 G/DL
RBC # BLD: 2.97 M/UL
RBC # FLD: 12.1 %
SODIUM SERPL-SCNC: 139 MMOL/L
T3RU NFR SERPL: 1 TBI
T4 SERPL-MCNC: 7.9 UG/DL
TRIGL SERPL-MCNC: 213 MG/DL
TSH SERPL-ACNC: 2.76 UIU/ML
URATE SERPL-MCNC: 5 MG/DL
WBC # FLD AUTO: 7.36 K/UL

## 2021-06-21 ENCOUNTER — RX RENEWAL (OUTPATIENT)
Age: 66
End: 2021-06-21

## 2021-06-23 ENCOUNTER — RX RENEWAL (OUTPATIENT)
Age: 66
End: 2021-06-23

## 2021-06-27 ENCOUNTER — RX RENEWAL (OUTPATIENT)
Age: 66
End: 2021-06-27

## 2021-08-26 ENCOUNTER — RX RENEWAL (OUTPATIENT)
Age: 66
End: 2021-08-26

## 2021-08-31 ENCOUNTER — RX RENEWAL (OUTPATIENT)
Age: 66
End: 2021-08-31

## 2021-09-10 ENCOUNTER — LABORATORY RESULT (OUTPATIENT)
Age: 66
End: 2021-09-10

## 2021-09-10 ENCOUNTER — APPOINTMENT (OUTPATIENT)
Dept: INTERNAL MEDICINE | Facility: CLINIC | Age: 66
End: 2021-09-10
Payer: MEDICARE

## 2021-09-10 VITALS
HEIGHT: 60 IN | DIASTOLIC BLOOD PRESSURE: 80 MMHG | BODY MASS INDEX: 22.97 KG/M2 | WEIGHT: 117 LBS | SYSTOLIC BLOOD PRESSURE: 160 MMHG

## 2021-09-10 DIAGNOSIS — R05 COUGH: ICD-10-CM

## 2021-09-10 PROCEDURE — 99214 OFFICE O/P EST MOD 30 MIN: CPT | Mod: 25

## 2021-09-10 PROCEDURE — 36415 COLL VENOUS BLD VENIPUNCTURE: CPT

## 2021-09-10 RX ORDER — AMLODIPINE BESYLATE 2.5 MG/1
2.5 TABLET ORAL
Qty: 90 | Refills: 0 | Status: DISCONTINUED | COMMUNITY
Start: 2020-08-12 | End: 2021-09-10

## 2021-09-10 RX ORDER — AZELASTINE HYDROCHLORIDE 205.5 UG/1
0.15 SPRAY, METERED NASAL
Qty: 30 | Refills: 0 | Status: DISCONTINUED | COMMUNITY
Start: 2020-06-23 | End: 2021-09-10

## 2021-09-10 NOTE — PHYSICAL EXAM
[No JVD] : no jugular venous distention [Normal] : soft, non-tender, non-distended, no masses palpated, no HSM and normal bowel sounds [No Focal Deficits] : no focal deficits

## 2021-09-10 NOTE — REVIEW OF SYSTEMS
[Palpitations] : palpitations [Nocturia] : nocturia [Frequency] : frequency [Negative] : Gastrointestinal

## 2021-09-10 NOTE — ASSESSMENT
[FreeTextEntry1] : Is a 66-year-old female with von Hippel-Lindau syndrome\par \par She has a history of hypertension her blood pressure is and remains above goal despite weight loss I will increase her amlodipine although I am not sure this is going to be successful she does have a history of cough secondary to ACE inhibitors\par \par She has a history of hypercholesterolemia she remains on atorvastatin a cholesterol profile has been obtained medication changes predicated on the results\par \par In terms of her nocturia she is on a diuretic she drinks before she goes to bed and she often gets cold during the night I told her to stop drinking after 7 PM at this point I think that is the only intervention that is necessary\par \par She has a history of anemia secondary to her veg F inhibitor we will repeat her CBC\par \par She has a history of palpitation she is on atenolol with good results palpitations have diminished significantly\par \par Terms of her edema it is chronic and intermittent probably venous insufficiency no further intervention\par \par In terms of her sexual dysfunction this may be medication related but I do not think so we discussed the usual interventions she can discuss this with OB/GYN as well

## 2021-09-10 NOTE — HISTORY OF PRESENT ILLNESS
[FreeTextEntry1] : Is a 66-year-old female for evaluation and treatment of her  Hippel Lindau syndrome hypertension hypercholesterolemia anemia and palpitations [de-identified] : Patient is feeling well she has several small complaints.  She has continued edema in her left lower extremity which waxes and wanes and has been chronic\par \par She has dramatically less palpitations but they are there occasionally\par \par She is concerned about the vaccinations both flu and Covid\par \par She is having nocturia x3\par \par She is also having difficulty with sexual function

## 2021-09-11 LAB
25(OH)D3 SERPL-MCNC: 55.2 NG/ML
ALBUMIN SERPL ELPH-MCNC: 5 G/DL
ALP BLD-CCNC: 71 U/L
ALT SERPL-CCNC: 16 U/L
ANION GAP SERPL CALC-SCNC: 12 MMOL/L
AST SERPL-CCNC: 21 U/L
BASOPHILS # BLD AUTO: 0.07 K/UL
BASOPHILS NFR BLD AUTO: 1 %
BILIRUB SERPL-MCNC: 0.3 MG/DL
BUN SERPL-MCNC: 30 MG/DL
CALCIUM SERPL-MCNC: 10.4 MG/DL
CHLORIDE SERPL-SCNC: 93 MMOL/L
CHOLEST SERPL-MCNC: 181 MG/DL
CO2 SERPL-SCNC: 31 MMOL/L
CREAT SERPL-MCNC: 1.07 MG/DL
EOSINOPHIL # BLD AUTO: 0.07 K/UL
EOSINOPHIL NFR BLD AUTO: 1 %
ESTIMATED AVERAGE GLUCOSE: 123 MG/DL
GLUCOSE SERPL-MCNC: 106 MG/DL
HBA1C MFR BLD HPLC: 5.9 %
HCT VFR BLD CALC: 29 %
HDLC SERPL-MCNC: 56 MG/DL
HGB BLD-MCNC: 9.5 G/DL
IMM GRANULOCYTES NFR BLD AUTO: 0.3 %
LDLC SERPL CALC-MCNC: 94 MG/DL
LYMPHOCYTES # BLD AUTO: 1.08 K/UL
LYMPHOCYTES NFR BLD AUTO: 15.5 %
MAN DIFF?: NORMAL
MCHC RBC-ENTMCNC: 31.1 PG
MCHC RBC-ENTMCNC: 32.8 GM/DL
MCV RBC AUTO: 95.1 FL
MONOCYTES # BLD AUTO: 0.48 K/UL
MONOCYTES NFR BLD AUTO: 6.9 %
NEUTROPHILS # BLD AUTO: 5.24 K/UL
NEUTROPHILS NFR BLD AUTO: 75.3 %
NONHDLC SERPL-MCNC: 125 MG/DL
PLATELET # BLD AUTO: 224 K/UL
POTASSIUM SERPL-SCNC: 3.8 MMOL/L
PROT SERPL-MCNC: 7.1 G/DL
RBC # BLD: 3.05 M/UL
RBC # FLD: 12 %
SODIUM SERPL-SCNC: 136 MMOL/L
T3RU NFR SERPL: 1.1 TBI
T4 SERPL-MCNC: 8 UG/DL
TRIGL SERPL-MCNC: 157 MG/DL
TSH SERPL-ACNC: 2.27 UIU/ML
URATE SERPL-MCNC: 6.4 MG/DL
WBC # FLD AUTO: 6.96 K/UL

## 2021-11-08 ENCOUNTER — RX RENEWAL (OUTPATIENT)
Age: 66
End: 2021-11-08

## 2021-11-19 ENCOUNTER — RX RENEWAL (OUTPATIENT)
Age: 66
End: 2021-11-19

## 2021-11-30 ENCOUNTER — NON-APPOINTMENT (OUTPATIENT)
Age: 66
End: 2021-11-30

## 2021-11-30 ENCOUNTER — APPOINTMENT (OUTPATIENT)
Dept: CARDIOLOGY | Facility: CLINIC | Age: 66
End: 2021-11-30
Payer: MEDICARE

## 2021-11-30 VITALS — SYSTOLIC BLOOD PRESSURE: 144 MMHG | HEART RATE: 93 BPM | OXYGEN SATURATION: 99 % | DIASTOLIC BLOOD PRESSURE: 80 MMHG

## 2021-11-30 VITALS — SYSTOLIC BLOOD PRESSURE: 142 MMHG | DIASTOLIC BLOOD PRESSURE: 80 MMHG

## 2021-11-30 DIAGNOSIS — R07.89 OTHER CHEST PAIN: ICD-10-CM

## 2021-11-30 DIAGNOSIS — R06.00 DYSPNEA, UNSPECIFIED: ICD-10-CM

## 2021-11-30 PROCEDURE — 99205 OFFICE O/P NEW HI 60 MIN: CPT

## 2021-11-30 PROCEDURE — 93000 ELECTROCARDIOGRAM COMPLETE: CPT

## 2021-11-30 NOTE — DISCUSSION/SUMMARY
[FreeTextEntry1] : She is a 66-year-old with a history of hypertension, hypercholesterolemia and von Hippel-Lindau syndrome on VEGF therapy.\par Blood pressure control is not optimal and I have suggested exchanging atenolol for nebivolol 5 mg once daily as it both is a longer acting beta-blocker as well as has some increased nitric oxide endovascular production which may both improve her blood pressure and reduce her sexual dysfunction issues.\par I have suggested that she undergo echocardiography in order to exclude any structural heart disease or LVH that would be a sign of longstanding hypertension that is poorly controlled.\par She will follow-up with me in 4 to 6 weeks at which time we will repeat measure her blood pressure.\par Her left arm discomfort do not sound anginal in nature, I would consider repeating her stress echocardiogram after her next visit if her blood pressure is well controlled.\par

## 2021-11-30 NOTE — HISTORY OF PRESENT ILLNESS
[FreeTextEntry1] : Dear Antelmo,\nori Thank you for referring her for cardiovascular patient.  She is a 66-year-old with a history of hypertension, hypercholesterolemia and von Hippel-Lindau syndrome who has been taking a VEGF inhibitor with good response.\nori Is referred to cardiology for evaluation of the left arm pain episode.  She describes being on the phone with a friend and feeling her left arm and the side of her body become tingly.  This episode lasted a few seconds and resolve spontaneously.  She has not had any episodes similar to this in the past.\par She exercises routinely including walking on a treadmill for up to an hour up to 3-1/2 miles an hour without any chest pains shortness of breath or left-sided discomfort.\par She has no history of coronary artery disease and stress echocardiogram done 2015 was normal.\par She has no history of diabetes mellitus, smoking or but does have a maternal history of coronary disease.\par She also notes concern for medication induced delayed orgasm of late.

## 2021-12-02 ENCOUNTER — APPOINTMENT (OUTPATIENT)
Dept: CARDIOLOGY | Facility: CLINIC | Age: 66
End: 2021-12-02
Payer: MEDICARE

## 2021-12-02 PROCEDURE — 93306 TTE W/DOPPLER COMPLETE: CPT

## 2021-12-10 ENCOUNTER — APPOINTMENT (OUTPATIENT)
Dept: INTERNAL MEDICINE | Facility: CLINIC | Age: 66
End: 2021-12-10
Payer: MEDICARE

## 2021-12-10 ENCOUNTER — LABORATORY RESULT (OUTPATIENT)
Age: 66
End: 2021-12-10

## 2021-12-10 VITALS
BODY MASS INDEX: 22.53 KG/M2 | HEIGHT: 60.24 IN | DIASTOLIC BLOOD PRESSURE: 82 MMHG | SYSTOLIC BLOOD PRESSURE: 148 MMHG | WEIGHT: 116.25 LBS

## 2021-12-10 DIAGNOSIS — R37 SEXUAL DYSFUNCTION, UNSPECIFIED: ICD-10-CM

## 2021-12-10 PROCEDURE — 99214 OFFICE O/P EST MOD 30 MIN: CPT | Mod: 25

## 2021-12-10 PROCEDURE — 36415 COLL VENOUS BLD VENIPUNCTURE: CPT

## 2021-12-10 NOTE — ASSESSMENT
[FreeTextEntry1] : This is a 66-year-old female whose history has been reviewed above\par \par She has a history of von Hippel-Lindau disease she is on a VEGF inhibitor.  She is followed by the [facility] her manifestations have been renal neoplasias she has had 3-4 removed.  These were removed only after they get to 3 cm or greater as they are not aggressive in general.  She has had no angiomas but has had retinal findings.  Follow-up at [facility] has demonstrated shrinkage of the lesions\par \par She does have anemia associated with her therapy we continue to follow her CBC\par \par She has hypertension her blood pressure is somewhat better controlled on Bystolic she has no orthostasis I will make no medication changes at this time\par \par She has a history of hypercholesterolemia she remains on a statin a cholesterol profile has been obtained medication changes predicated on the results\par \par She is due for mammography and bone density she is up-to-date with colonoscopy she did have a tubular adenoma in the past.  She is up-to-date with vaccinations with the exception of shingles\par \par She did have some nondescript cardiovascular symptoms she was seen by cardiology.  She had stress testing in 2015 which was normal and an echocardiogram in 2021 which was essentially normal good left ventricular function

## 2021-12-10 NOTE — HEALTH RISK ASSESSMENT
[Good] : ~his/her~  mood as  good [Yes] : Yes [No falls in past year] : Patient reported no falls in the past year [0] : 1) Little interest or pleasure doing things: Not at all (0) [PHQ-2 Negative - No further assessment needed] : PHQ-2 Negative - No further assessment needed [None] : None [With Significant Other] : lives with significant other [Employed] : employed [College] : College [] :  [Sexually Active] : sexually active [Feels Safe at Home] : Feels safe at home [Fully functional (bathing, dressing, toileting, transferring, walking, feeding)] : Fully functional (bathing, dressing, toileting, transferring, walking, feeding) [Fully functional (using the telephone, shopping, preparing meals, housekeeping, doing laundry, using] : Fully functional and needs no help or supervision to perform IADLs (using the telephone, shopping, preparing meals, housekeeping, doing laundry, using transportation, managing medications and managing finances) [Reports changes in vision] : Reports changes in vision [Carbon Monoxide Detector] : carbon monoxide detector [Seat Belt] :  uses seat belt [Sunscreen] : uses sunscreen [I will adhere to the patient's wishes.] : I will adhere to the patient's wishes. [] : No [Change in mental status noted] : No change in mental status noted [Reports changes in hearing] : Reports no changes in hearing [Reports changes in dental health] : Reports no changes in dental health [Smoke Detector] : no smoke detector [Guns at Home] : no guns at home [Safety elements used in home] : no safety elements used in home [Travel to Developing Areas] : does not  travel to developing areas [TB Exposure] : is not being exposed to tuberculosis [Caregiver Concerns] : does not have caregiver concerns [FreeTextEntry4] :  is healthcare proxy

## 2021-12-10 NOTE — HISTORY OF PRESENT ILLNESS
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  She had had difficulty with orgasm she has changed her medications and feels that this may be improving.  She does follow-up with the [facility] who tells her that she is doing well

## 2021-12-11 LAB
25(OH)D3 SERPL-MCNC: 41.4 NG/ML
ALBUMIN SERPL ELPH-MCNC: 5 G/DL
ALP BLD-CCNC: 76 U/L
ALT SERPL-CCNC: 16 U/L
ANION GAP SERPL CALC-SCNC: 17 MMOL/L
APPEARANCE: CLEAR
AST SERPL-CCNC: 26 U/L
BASOPHILS # BLD AUTO: 0.08 K/UL
BASOPHILS NFR BLD AUTO: 1.1 %
BILIRUB SERPL-MCNC: 0.3 MG/DL
BILIRUBIN URINE: NEGATIVE
BLOOD URINE: NEGATIVE
BUN SERPL-MCNC: 29 MG/DL
CALCIUM SERPL-MCNC: 10.4 MG/DL
CHLORIDE SERPL-SCNC: 94 MMOL/L
CHOLEST SERPL-MCNC: 198 MG/DL
CO2 SERPL-SCNC: 26 MMOL/L
COLOR: COLORLESS
CREAT SERPL-MCNC: 1.22 MG/DL
EOSINOPHIL # BLD AUTO: 0.06 K/UL
EOSINOPHIL NFR BLD AUTO: 0.8 %
ESTIMATED AVERAGE GLUCOSE: 120 MG/DL
GLUCOSE QUALITATIVE U: NEGATIVE
GLUCOSE SERPL-MCNC: 99 MG/DL
HBA1C MFR BLD HPLC: 5.8 %
HCT VFR BLD CALC: 29.6 %
HDLC SERPL-MCNC: 62 MG/DL
HGB BLD-MCNC: 9.6 G/DL
IMM GRANULOCYTES NFR BLD AUTO: 0.4 %
KETONES URINE: NEGATIVE
LDLC SERPL CALC-MCNC: 106 MG/DL
LEUKOCYTE ESTERASE URINE: NEGATIVE
LYMPHOCYTES # BLD AUTO: 1.59 K/UL
LYMPHOCYTES NFR BLD AUTO: 21.8 %
MAN DIFF?: NORMAL
MCHC RBC-ENTMCNC: 31.6 PG
MCHC RBC-ENTMCNC: 32.4 GM/DL
MCV RBC AUTO: 97.4 FL
MONOCYTES # BLD AUTO: 0.54 K/UL
MONOCYTES NFR BLD AUTO: 7.4 %
NEUTROPHILS # BLD AUTO: 5.01 K/UL
NEUTROPHILS NFR BLD AUTO: 68.5 %
NITRITE URINE: NEGATIVE
NONHDLC SERPL-MCNC: 136 MG/DL
PH URINE: 7
PLATELET # BLD AUTO: 305 K/UL
POTASSIUM SERPL-SCNC: 3.9 MMOL/L
PROT SERPL-MCNC: 7.7 G/DL
PROTEIN URINE: NEGATIVE
RBC # BLD: 3.04 M/UL
RBC # FLD: 11.9 %
SODIUM SERPL-SCNC: 137 MMOL/L
SPECIFIC GRAVITY URINE: 1.01
T3RU NFR SERPL: 1.1 TBI
T4 SERPL-MCNC: 8 UG/DL
TRIGL SERPL-MCNC: 151 MG/DL
TSH SERPL-ACNC: 2.81 UIU/ML
URATE SERPL-MCNC: 5.3 MG/DL
UROBILINOGEN URINE: NORMAL
WBC # FLD AUTO: 7.31 K/UL

## 2021-12-14 ENCOUNTER — APPOINTMENT (OUTPATIENT)
Dept: INTERNAL MEDICINE | Facility: CLINIC | Age: 66
End: 2021-12-14
Payer: MEDICARE

## 2021-12-14 PROCEDURE — 90471 IMMUNIZATION ADMIN: CPT

## 2021-12-14 PROCEDURE — 90715 TDAP VACCINE 7 YRS/> IM: CPT | Mod: GY

## 2022-01-07 ENCOUNTER — RX RENEWAL (OUTPATIENT)
Age: 67
End: 2022-01-07

## 2022-01-11 DIAGNOSIS — Z20.822 CONTACT WITH AND (SUSPECTED) EXPOSURE TO COVID-19: ICD-10-CM

## 2022-03-07 ENCOUNTER — RX RENEWAL (OUTPATIENT)
Age: 67
End: 2022-03-07

## 2022-03-07 ENCOUNTER — APPOINTMENT (OUTPATIENT)
Dept: INTERNAL MEDICINE | Facility: CLINIC | Age: 67
End: 2022-03-07
Payer: MEDICARE

## 2022-03-07 ENCOUNTER — LABORATORY RESULT (OUTPATIENT)
Age: 67
End: 2022-03-07

## 2022-03-07 VITALS
HEIGHT: 60.24 IN | DIASTOLIC BLOOD PRESSURE: 80 MMHG | BODY MASS INDEX: 22.19 KG/M2 | WEIGHT: 114.5 LBS | SYSTOLIC BLOOD PRESSURE: 142 MMHG

## 2022-03-07 PROCEDURE — 36415 COLL VENOUS BLD VENIPUNCTURE: CPT

## 2022-03-07 PROCEDURE — 99214 OFFICE O/P EST MOD 30 MIN: CPT | Mod: 25

## 2022-03-07 NOTE — HISTORY OF PRESENT ILLNESS
[FreeTextEntry1] : A 66-year-old female for evaluation treatment of her hypertension hypercholesterolemia von Hippel-Lindau renal carcinomas [de-identified] : Patient is feeling well she has no cardiovascular complaints.  She is describing 3 episodes in which on awakening she saw what she describes as hallucinations or images of people that were not there.  These were evanescent and lasted less than a second by her description

## 2022-03-07 NOTE — ASSESSMENT
[FreeTextEntry1] : This is a 66-year-old female whose history has been reviewed above\par \par She has a history of Patito von Hippel-Lindau she is followed by the [facility] and is on a veg F inhibitor.  She does have renal lesions that are followed by the [facility] no intervention\par \par She has a history of hypertension her blood pressure is at goal she has no orthostasis no medication changes\par \par She has a history of hypercholesterolemia she remains on a statin a cholesterol profile has been obtained medication changes predicated on the results\par \par In terms of these new visions I suspect these are just episodes on waking from dreams but I did ask her to call the [facility] to see if this could be medication or even related to a hemangioma\par \par Patient remains fully vaccinated\par \par Mild sexual dysfunction seems to have improved on Bystolic.  She is not complaining of any left arm pain

## 2022-03-08 LAB
25(OH)D3 SERPL-MCNC: 32 NG/ML
ALBUMIN SERPL ELPH-MCNC: 4.8 G/DL
ALP BLD-CCNC: 70 U/L
ALT SERPL-CCNC: 14 U/L
ANION GAP SERPL CALC-SCNC: 13 MMOL/L
AST SERPL-CCNC: 25 U/L
BASOPHILS # BLD AUTO: 0.05 K/UL
BASOPHILS NFR BLD AUTO: 0.6 %
BILIRUB SERPL-MCNC: <0.2 MG/DL
BUN SERPL-MCNC: 26 MG/DL
CALCIUM SERPL-MCNC: 9.8 MG/DL
CHLORIDE SERPL-SCNC: 98 MMOL/L
CHOLEST SERPL-MCNC: 188 MG/DL
CO2 SERPL-SCNC: 28 MMOL/L
CREAT SERPL-MCNC: 1.11 MG/DL
EGFR: 55 ML/MIN/1.73M2
EOSINOPHIL # BLD AUTO: 0.04 K/UL
EOSINOPHIL NFR BLD AUTO: 0.5 %
ESTIMATED AVERAGE GLUCOSE: 117 MG/DL
GLUCOSE SERPL-MCNC: 104 MG/DL
HBA1C MFR BLD HPLC: 5.7 %
HCT VFR BLD CALC: 28.6 %
HDLC SERPL-MCNC: 51 MG/DL
HGB BLD-MCNC: 9.5 G/DL
IMM GRANULOCYTES NFR BLD AUTO: 0.1 %
LDLC SERPL CALC-MCNC: 98 MG/DL
LYMPHOCYTES # BLD AUTO: 1.66 K/UL
LYMPHOCYTES NFR BLD AUTO: 19.4 %
MAN DIFF?: NORMAL
MCHC RBC-ENTMCNC: 31.8 PG
MCHC RBC-ENTMCNC: 33.2 GM/DL
MCV RBC AUTO: 95.7 FL
MONOCYTES # BLD AUTO: 0.57 K/UL
MONOCYTES NFR BLD AUTO: 6.7 %
NEUTROPHILS # BLD AUTO: 6.22 K/UL
NEUTROPHILS NFR BLD AUTO: 72.7 %
NONHDLC SERPL-MCNC: 137 MG/DL
PLATELET # BLD AUTO: 327 K/UL
POTASSIUM SERPL-SCNC: 3.7 MMOL/L
PROT SERPL-MCNC: 7.3 G/DL
RBC # BLD: 2.99 M/UL
RBC # FLD: 11.9 %
SODIUM SERPL-SCNC: 140 MMOL/L
T3RU NFR SERPL: 1.1 TBI
T4 SERPL-MCNC: 7.1 UG/DL
TRIGL SERPL-MCNC: 196 MG/DL
TSH SERPL-ACNC: 3.59 UIU/ML
URATE SERPL-MCNC: 5.3 MG/DL
WBC # FLD AUTO: 8.55 K/UL

## 2022-03-18 ENCOUNTER — NON-APPOINTMENT (OUTPATIENT)
Age: 67
End: 2022-03-18

## 2022-03-18 ENCOUNTER — APPOINTMENT (OUTPATIENT)
Dept: CARDIOLOGY | Facility: CLINIC | Age: 67
End: 2022-03-18
Payer: MEDICARE

## 2022-03-18 VITALS
OXYGEN SATURATION: 98 % | HEART RATE: 79 BPM | WEIGHT: 114 LBS | BODY MASS INDEX: 22.09 KG/M2 | DIASTOLIC BLOOD PRESSURE: 88 MMHG | SYSTOLIC BLOOD PRESSURE: 132 MMHG

## 2022-03-18 DIAGNOSIS — R00.2 PALPITATIONS: ICD-10-CM

## 2022-03-18 PROCEDURE — 93000 ELECTROCARDIOGRAM COMPLETE: CPT

## 2022-03-18 PROCEDURE — 99213 OFFICE O/P EST LOW 20 MIN: CPT

## 2022-03-18 NOTE — HISTORY OF PRESENT ILLNESS
[FreeTextEntry1] : Walking for exercise.\par 5-13k steps. Feels well. hydrates.\par noom diet.\par \par Prior:\par Dear Antelmo,\par Thank you for referring her for cardiovascular patient.  She is a 66-year-old with a history of hypertension, hypercholesterolemia and von Hippel-Lindau syndrome who has been taking a VEGF inhibitor with good response.\par Is referred to cardiology for evaluation of the left arm pain episode.  She describes being on the phone with a friend and feeling her left arm and the side of her body become tingly.  This episode lasted a few seconds and resolve spontaneously.  She has not had any episodes similar to this in the past.\par She exercises routinely including walking on a treadmill for up to an hour up to 3-1/2 miles an hour without any chest pains shortness of breath or left-sided discomfort.\par She has no history of coronary artery disease and stress echocardiogram done 2015 was normal.\par She has no history of diabetes mellitus, smoking or but does have a maternal history of coronary disease.\par She also notes concern for medication induced delayed orgasm of late.

## 2022-03-18 NOTE — DISCUSSION/SUMMARY
[FreeTextEntry1] : She is a 66-year-old with a history of hypertension, hypercholesterolemia and von Hippel-Lindau syndrome on VEGF therapy.\par Blood pressure control is now optimal. Echo was fine. No change to medications.\par HLD: continue lipitor.\par 6 month followup

## 2022-03-21 ENCOUNTER — NON-APPOINTMENT (OUTPATIENT)
Age: 67
End: 2022-03-21

## 2022-03-21 ENCOUNTER — APPOINTMENT (OUTPATIENT)
Dept: OPHTHALMOLOGY | Facility: CLINIC | Age: 67
End: 2022-03-21
Payer: MEDICARE

## 2022-03-21 PROCEDURE — 76514 ECHO EXAM OF EYE THICKNESS: CPT

## 2022-03-21 PROCEDURE — 92083 EXTENDED VISUAL FIELD XM: CPT

## 2022-03-21 PROCEDURE — 92020 GONIOSCOPY: CPT

## 2022-03-21 PROCEDURE — 92004 COMPRE OPH EXAM NEW PT 1/>: CPT

## 2022-03-21 PROCEDURE — 92250 FUNDUS PHOTOGRAPHY W/I&R: CPT

## 2022-04-05 ENCOUNTER — NON-APPOINTMENT (OUTPATIENT)
Age: 67
End: 2022-04-05

## 2022-04-06 ENCOUNTER — TRANSCRIPTION ENCOUNTER (OUTPATIENT)
Age: 67
End: 2022-04-06

## 2022-04-06 ENCOUNTER — NON-APPOINTMENT (OUTPATIENT)
Age: 67
End: 2022-04-06

## 2022-04-07 ENCOUNTER — OUTPATIENT (OUTPATIENT)
Dept: OUTPATIENT SERVICES | Facility: HOSPITAL | Age: 67
LOS: 1 days | End: 2022-04-07

## 2022-04-07 ENCOUNTER — APPOINTMENT (OUTPATIENT)
Dept: DISASTER EMERGENCY | Facility: HOSPITAL | Age: 67
End: 2022-04-07

## 2022-04-07 VITALS
RESPIRATION RATE: 16 BRPM | TEMPERATURE: 98 F | WEIGHT: 111.99 LBS | SYSTOLIC BLOOD PRESSURE: 108 MMHG | DIASTOLIC BLOOD PRESSURE: 71 MMHG | HEART RATE: 88 BPM | HEIGHT: 61 IN | OXYGEN SATURATION: 98 %

## 2022-04-07 VITALS
SYSTOLIC BLOOD PRESSURE: 109 MMHG | HEART RATE: 82 BPM | DIASTOLIC BLOOD PRESSURE: 71 MMHG | TEMPERATURE: 99 F | OXYGEN SATURATION: 99 % | RESPIRATION RATE: 20 BRPM

## 2022-04-07 DIAGNOSIS — Z98.891 HISTORY OF UTERINE SCAR FROM PREVIOUS SURGERY: Chronic | ICD-10-CM

## 2022-04-07 DIAGNOSIS — N28.89 OTHER SPECIFIED DISORDERS OF KIDNEY AND URETER: Chronic | ICD-10-CM

## 2022-04-07 DIAGNOSIS — Z98.49 CATARACT EXTRACTION STATUS, UNSPECIFIED EYE: Chronic | ICD-10-CM

## 2022-04-07 DIAGNOSIS — U07.1 COVID-19: ICD-10-CM

## 2022-04-07 DIAGNOSIS — Z98.890 OTHER SPECIFIED POSTPROCEDURAL STATES: Chronic | ICD-10-CM

## 2022-04-07 RX ORDER — BEBTELOVIMAB 87.5 MG/ML
175 INJECTION, SOLUTION INTRAVENOUS ONCE
Refills: 0 | Status: COMPLETED | OUTPATIENT
Start: 2022-04-07 | End: 2022-04-07

## 2022-04-07 RX ADMIN — BEBTELOVIMAB 175 MILLIGRAM(S): 87.5 INJECTION, SOLUTION INTRAVENOUS at 08:12

## 2022-04-07 NOTE — CHART NOTE - NSCHARTNOTEFT_GEN_A_CORE
CC: Monoclonal Antibody Administration/COVID 19 Positive      History: Patient presents for administration of monoclonal antibody  Patient has been screened and was deemed to be a candidate.    Exposure: Attended wedding last weekend    Symptoms/ Criteria: fever cough rhinitis body aches    Inclusion Criteria: Adenoma HTN age > 65 years    Date of Positive Test: verified by clinical call center     Date of symptom onset:     Vaccine status: Moderna x 3 > 10/21    PMHx:  No pertinent family history in first degree relatives    Infection due to severe acute respiratory syndrome coronavirus 2 (SARS-CoV-2)    Handoff    No pertinent past medical history    Hypertension    Hypercholesteremia    Renal cancer    VHL (von Hippel-Lindau syndrome)    Retinal angioma    No significant past surgical history    Renal mass    Status post cataract extraction    History of  section    History of colonoscopy          T(C): 36.7 (22 @ 07:51), Max: 36.7 (22 @ 07:51)  HR: 88 (22 @ 07:51) (88 - 88)  BP: 108/71 (22 @ 07:51) (108/71 - 108/71)  RR: 16 (22 @ 07:51) (16 - 16)  SpO2: 98% (22 @ 07:51) (98% - 98%)      PE:   Appearance: NAD	  HEENT:   Normal oral mucosa.   Lymphatic: No lymphadenopathy  Cardiovascular: Normal S1 S2, No JVD, No murmurs, No edema  Respiratory: Lungs clear to auscultation	  Gastrointestinal:  Soft, Non-tender. No guarding   Skin: warm and dry  Neurologic: Non-focal  Extremities: Normal range of motion.     ASSESSMENT:  Pt is a 66y year old Female with PMH  HTN Tubular adenoma Covid +  referred to the infusion center for Monoclonal antibody administration  (Bebtelovimab).        PLAN:  - Administration procedure explained to patient   - Consent for monoclonal antibody administration obtained   - Risk & benefits discussed/all questions answered  - Administer Bebtelovimab per protocol  - will observe patient for one hour post administration  and then if stable discharge home with outpt follow up as planned by PMD.        - Patient tolerated infusion well denies complaints of chest pain/SOB/dizziness/ palpitations  - VSS for discharge home  - D/C instructions given/ fact sheet included.  - Patient to follow-up with PCP as needed.

## 2022-04-11 PROBLEM — R37 SEXUAL DYSFUNCTION: Status: ACTIVE | Noted: 2021-11-30

## 2022-04-27 ENCOUNTER — RX RENEWAL (OUTPATIENT)
Age: 67
End: 2022-04-27

## 2022-05-02 ENCOUNTER — TRANSCRIPTION ENCOUNTER (OUTPATIENT)
Age: 67
End: 2022-05-02

## 2022-05-25 ENCOUNTER — RX RENEWAL (OUTPATIENT)
Age: 67
End: 2022-05-25

## 2022-05-26 ENCOUNTER — RX RENEWAL (OUTPATIENT)
Age: 67
End: 2022-05-26

## 2022-06-02 ENCOUNTER — APPOINTMENT (OUTPATIENT)
Dept: OPHTHALMOLOGY | Facility: CLINIC | Age: 67
End: 2022-06-02
Payer: MEDICARE

## 2022-06-02 ENCOUNTER — NON-APPOINTMENT (OUTPATIENT)
Age: 67
End: 2022-06-02

## 2022-06-02 PROCEDURE — 92133 CPTRZD OPH DX IMG PST SGM ON: CPT

## 2022-06-02 PROCEDURE — 92012 INTRM OPH EXAM EST PATIENT: CPT

## 2022-06-02 PROCEDURE — 92083 EXTENDED VISUAL FIELD XM: CPT

## 2022-08-31 ENCOUNTER — APPOINTMENT (OUTPATIENT)
Dept: INTERNAL MEDICINE | Facility: CLINIC | Age: 67
End: 2022-08-31

## 2022-08-31 ENCOUNTER — LABORATORY RESULT (OUTPATIENT)
Age: 67
End: 2022-08-31

## 2022-08-31 VITALS
HEIGHT: 60.24 IN | WEIGHT: 115 LBS | DIASTOLIC BLOOD PRESSURE: 80 MMHG | BODY MASS INDEX: 22.28 KG/M2 | SYSTOLIC BLOOD PRESSURE: 138 MMHG

## 2022-08-31 PROCEDURE — 36415 COLL VENOUS BLD VENIPUNCTURE: CPT

## 2022-08-31 PROCEDURE — 99214 OFFICE O/P EST MOD 30 MIN: CPT | Mod: 25

## 2022-08-31 NOTE — ASSESSMENT
[FreeTextEntry1] : Nisa Stone is a 67-year-old female whose history has been reviewed above\par \par Has a history of von Hippel-Lindau disease she is followed at the [facility] she is on an experimental drug which I believe is a veg F inhibitor she seems to be doing well with reports of regression from the [facility]\par \par She has a history of hypercholesterolemia she remains on a statin a cholesterol profile has been obtained medication changes predicated on the results\par \par She has a history of hypertension her blood pressure is not at goal we will increase her amlodipine to 7.5\par \par She continues to have some sexual dysfunction perhaps a vasodilator may be helpful certainly I have encouraged her to seek help perhaps at the [facility].

## 2022-08-31 NOTE — HISTORY OF PRESENT ILLNESS
[FreeTextEntry1] : This is a 67-year-old female for evaluation and treatment of her hypertension hypercholesterolemia von Hippel-Lindau disease anemia secondary to her veg F.  She also has had renal carcinoma [de-identified] : Patient is feeling quite well she has no systemic complaints she does remain anxious during blood pressure examinations

## 2022-08-31 NOTE — ASSESSMENT
[FreeTextEntry1] : Nisa Stone is a 67-year-old female whose history has been reviewed above\par \par Has a history of von Hippel-Lindau disease she is followed at the [facility] she is on an experimental drug which I believe is a veg F inhibitor she seems to be doing well with reports of regression from the [facility]\par \par She has a history of hypercholesterolemia she remains on a statin a cholesterol profile has been obtained medication changes predicated on the results\par \par She has a history of hypertension her blood pressure is not at goal we will increase her amlodipine to 7.5\par \par She continues to have some sexual dysfunction perhaps a vasodilator may be helpful certainly I have encouraged her to seek help perhaps at the [facility].

## 2022-08-31 NOTE — HISTORY OF PRESENT ILLNESS
[FreeTextEntry1] : This is a 67-year-old female for evaluation and treatment of her hypertension hypercholesterolemia von Hippel-Lindau disease anemia secondary to her veg F.  She also has had renal carcinoma [de-identified] : Patient is feeling quite well she has no systemic complaints she does remain anxious during blood pressure examinations

## 2022-09-01 ENCOUNTER — TRANSCRIPTION ENCOUNTER (OUTPATIENT)
Age: 67
End: 2022-09-01

## 2022-09-01 LAB
ALBUMIN SERPL ELPH-MCNC: 5 G/DL
ALP BLD-CCNC: 68 U/L
ALT SERPL-CCNC: 16 U/L
ANION GAP SERPL CALC-SCNC: 14 MMOL/L
AST SERPL-CCNC: 23 U/L
BASOPHILS # BLD AUTO: 0.07 K/UL
BASOPHILS NFR BLD AUTO: 1 %
BILIRUB SERPL-MCNC: 0.2 MG/DL
BUN SERPL-MCNC: 34 MG/DL
CALCIUM SERPL-MCNC: 10.4 MG/DL
CHLORIDE SERPL-SCNC: 96 MMOL/L
CHOLEST SERPL-MCNC: 196 MG/DL
CO2 SERPL-SCNC: 29 MMOL/L
CREAT SERPL-MCNC: 1.12 MG/DL
EGFR: 54 ML/MIN/1.73M2
EOSINOPHIL # BLD AUTO: 0.14 K/UL
EOSINOPHIL NFR BLD AUTO: 2 %
ESTIMATED AVERAGE GLUCOSE: 123 MG/DL
GLUCOSE SERPL-MCNC: 108 MG/DL
HBA1C MFR BLD HPLC: 5.9 %
HCT VFR BLD CALC: 27.4 %
HDLC SERPL-MCNC: 59 MG/DL
HGB BLD-MCNC: 9.1 G/DL
IMM GRANULOCYTES NFR BLD AUTO: 0.3 %
LDLC SERPL CALC-MCNC: 102 MG/DL
LYMPHOCYTES # BLD AUTO: 1.68 K/UL
LYMPHOCYTES NFR BLD AUTO: 24 %
MAN DIFF?: NORMAL
MCHC RBC-ENTMCNC: 31.2 PG
MCHC RBC-ENTMCNC: 33.2 GM/DL
MCV RBC AUTO: 93.8 FL
MONOCYTES # BLD AUTO: 0.64 K/UL
MONOCYTES NFR BLD AUTO: 9.1 %
NEUTROPHILS # BLD AUTO: 4.45 K/UL
NEUTROPHILS NFR BLD AUTO: 63.6 %
NONHDLC SERPL-MCNC: 136 MG/DL
PLATELET # BLD AUTO: 307 K/UL
POTASSIUM SERPL-SCNC: 3.7 MMOL/L
PROT SERPL-MCNC: 7.3 G/DL
RBC # BLD: 2.92 M/UL
RBC # FLD: 12.1 %
SODIUM SERPL-SCNC: 139 MMOL/L
T3RU NFR SERPL: 1.1 TBI
T4 SERPL-MCNC: 7.4 UG/DL
TRIGL SERPL-MCNC: 174 MG/DL
TSH SERPL-ACNC: 2.26 UIU/ML
URATE SERPL-MCNC: 5.7 MG/DL
WBC # FLD AUTO: 7 K/UL

## 2022-09-01 RX ORDER — AMLODIPINE BESYLATE 2.5 MG/1
2.5 TABLET ORAL
Qty: 1 | Refills: 3 | Status: DISCONTINUED | COMMUNITY
Start: 2022-08-31 | End: 2022-09-01

## 2022-09-12 ENCOUNTER — TRANSCRIPTION ENCOUNTER (OUTPATIENT)
Age: 67
End: 2022-09-12

## 2022-10-03 ENCOUNTER — RX RENEWAL (OUTPATIENT)
Age: 67
End: 2022-10-03

## 2022-11-01 ENCOUNTER — RX RENEWAL (OUTPATIENT)
Age: 67
End: 2022-11-01

## 2022-11-10 ENCOUNTER — RX RENEWAL (OUTPATIENT)
Age: 67
End: 2022-11-10

## 2022-12-01 ENCOUNTER — APPOINTMENT (OUTPATIENT)
Dept: OPHTHALMOLOGY | Facility: CLINIC | Age: 67
End: 2022-12-01

## 2022-12-01 ENCOUNTER — NON-APPOINTMENT (OUTPATIENT)
Age: 67
End: 2022-12-01

## 2022-12-01 PROCEDURE — 92083 EXTENDED VISUAL FIELD XM: CPT

## 2022-12-01 PROCEDURE — 92012 INTRM OPH EXAM EST PATIENT: CPT

## 2022-12-20 ENCOUNTER — RX RENEWAL (OUTPATIENT)
Age: 67
End: 2022-12-20

## 2022-12-20 ENCOUNTER — APPOINTMENT (OUTPATIENT)
Dept: INTERNAL MEDICINE | Facility: CLINIC | Age: 67
End: 2022-12-20

## 2022-12-20 ENCOUNTER — NON-APPOINTMENT (OUTPATIENT)
Age: 67
End: 2022-12-20

## 2022-12-20 ENCOUNTER — LABORATORY RESULT (OUTPATIENT)
Age: 67
End: 2022-12-20

## 2022-12-20 VITALS — DIASTOLIC BLOOD PRESSURE: 82 MMHG | SYSTOLIC BLOOD PRESSURE: 132 MMHG

## 2022-12-20 VITALS
HEIGHT: 60.24 IN | BODY MASS INDEX: 22.67 KG/M2 | SYSTOLIC BLOOD PRESSURE: 120 MMHG | DIASTOLIC BLOOD PRESSURE: 80 MMHG | WEIGHT: 117 LBS

## 2022-12-20 DIAGNOSIS — D36.9 BENIGN NEOPLASM, UNSPECIFIED SITE: ICD-10-CM

## 2022-12-20 PROCEDURE — G0439: CPT

## 2022-12-20 PROCEDURE — 93000 ELECTROCARDIOGRAM COMPLETE: CPT | Mod: 59

## 2022-12-20 PROCEDURE — 36415 COLL VENOUS BLD VENIPUNCTURE: CPT

## 2022-12-20 PROCEDURE — 99214 OFFICE O/P EST MOD 30 MIN: CPT | Mod: 25

## 2022-12-20 NOTE — ASSESSMENT
[FreeTextEntry1] : This is a 67-year-old female whose history has been reviewed above\par \par Is a history of hypertension her blood pressure today is under excellent control.  I believe the lack of lability today is because she is more relaxed\par \par She has a history of von Hippel-Lindau disease she is followed by the [facility] she remains on a VEGF inhibitor with excellent results he does have renal neoplasm she has had 3-4 removed she has not been imaged this year but will have imaging in the near future.  She has no CNS findings.  It is of interest that [facility] has in the past found shrinkage of her lesions on her new medications\par \par She does have anemia associated with her therapy we will continue to monitor her CBC\par \par She has a history of hypercholesterolemia she remains on a statin a cholesterol profile has been obtained medication changes predicated on the results\par \par She is up-to-date with mammography and colonoscopy she does have a history of a tubular adenoma she states that she will go for the shingles series however she is otherwise up-to-date.  She needs a bone density I have tried to convince her that this will be helpful this was ordered\par \par She did have some sexual dysfunction which seems to be improving no intervention\par \par In terms of her chest pain for completeness I will get a chest x-ray however in retrospect this is long-term and probably due to the wiring in her bra.\par \par In terms of her glaucoma she will follow-up with ophthalmology she is on multiple medications.  She knows to follow-up if she perceives any change in her vision.  I told her to check with me or ophthalmology if she is placed on any new medications\par \par She did have a history of tinnitus this interestingly enough was treated with Astelin and has remitted.  She thinks that she may have tubes in her ears but ENT is unsure no intervention\par \par She did have atypical chest pain for which she received a stress test in 2015.  This was normal and was a stress echo.  I did repeat her echo last year which again was normal with the exception of minimally dilated left atrium.  We will continue blood pressure control.  Her EKG remains stable with mild chronic nonspecific ST T wave changes\par \par Is traveling we will give her medication but only if this is approved by the [facility]\par \par \par

## 2022-12-20 NOTE — REVIEW OF SYSTEMS
[Vision Problems] : vision problems [Negative] : Heme/Lymph [FreeTextEntry8] : Improved sexual dysfunction

## 2022-12-20 NOTE — HISTORY OF PRESENT ILLNESS
[FreeTextEntry1] : This is a 67-year-old female for annual health assessment.  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  She has had 2 new complaints.  1 is the onset of glaucoma.  The other is really chronic but the first time that she is mentioned to me she has chest pain which is along her ribs.  This is nonexertional and sensitive to the touch\par \par She also has had tinnitus which was treated with Astelin and has resolved

## 2022-12-20 NOTE — HEALTH RISK ASSESSMENT
[Good] : ~his/her~ current health as good [Very Good] : ~his/her~  mood as very good [Never] : Never [Yes] : Yes [No falls in past year] : Patient reported no falls in the past year [0] : 1) Little interest or pleasure doing things: Not at all (0) [PHQ-2 Negative - No further assessment needed] : PHQ-2 Negative - No further assessment needed [None] : None [With Significant Other] : lives with significant other [Employed] : employed [College] : College [] :  [Sexually Active] : sexually active [Fully functional (bathing, dressing, toileting, transferring, walking, feeding)] : Fully functional (bathing, dressing, toileting, transferring, walking, feeding) [Fully functional (using the telephone, shopping, preparing meals, housekeeping, doing laundry, using] : Fully functional and needs no help or supervision to perform IADLs (using the telephone, shopping, preparing meals, housekeeping, doing laundry, using transportation, managing medications and managing finances) [Smoke Detector] : smoke detector [Carbon Monoxide Detector] : carbon monoxide detector [Seat Belt] :  uses seat belt [Sunscreen] : uses sunscreen [I will adhere to the patient's wishes.] : I will adhere to the patient's wishes. [Reports changes in hearing] : Reports changes in hearing [Reports changes in vision] : Reports changes in vision [Change in mental status noted] : No change in mental status noted [Reports changes in dental health] : Reports no changes in dental health [Guns at Home] : no guns at home [Safety elements used in home] : no safety elements used in home [Travel to Developing Areas] : does not  travel to developing areas [TB Exposure] : is not being exposed to tuberculosis [Caregiver Concerns] : does not have caregiver concerns [FreeTextEntry4] :  is healthcare proxy

## 2022-12-21 LAB
25(OH)D3 SERPL-MCNC: 40 NG/ML
ALBUMIN SERPL ELPH-MCNC: 4.6 G/DL
ALP BLD-CCNC: 67 U/L
ALT SERPL-CCNC: 11 U/L
ANION GAP SERPL CALC-SCNC: 13 MMOL/L
APPEARANCE: CLEAR
AST SERPL-CCNC: 20 U/L
BASOPHILS # BLD AUTO: 0.07 K/UL
BASOPHILS NFR BLD AUTO: 1 %
BILIRUB SERPL-MCNC: 0.2 MG/DL
BILIRUBIN URINE: NEGATIVE
BLOOD URINE: NEGATIVE
BUN SERPL-MCNC: 28 MG/DL
CALCIUM SERPL-MCNC: 10 MG/DL
CHLORIDE SERPL-SCNC: 94 MMOL/L
CHOLEST SERPL-MCNC: 200 MG/DL
CO2 SERPL-SCNC: 30 MMOL/L
COLOR: NORMAL
CREAT SERPL-MCNC: 1.17 MG/DL
EGFR: 51 ML/MIN/1.73M2
EOSINOPHIL # BLD AUTO: 0.1 K/UL
EOSINOPHIL NFR BLD AUTO: 1.4 %
ESTIMATED AVERAGE GLUCOSE: 123 MG/DL
GLUCOSE QUALITATIVE U: NEGATIVE
GLUCOSE SERPL-MCNC: 101 MG/DL
HBA1C MFR BLD HPLC: 5.9 %
HCT VFR BLD CALC: 29.1 %
HDLC SERPL-MCNC: 59 MG/DL
HGB BLD-MCNC: 9.7 G/DL
IMM GRANULOCYTES NFR BLD AUTO: 0.4 %
KETONES URINE: NEGATIVE
LDLC SERPL CALC-MCNC: 118 MG/DL
LEUKOCYTE ESTERASE URINE: NEGATIVE
LYMPHOCYTES # BLD AUTO: 1.36 K/UL
LYMPHOCYTES NFR BLD AUTO: 18.6 %
MAN DIFF?: NORMAL
MCHC RBC-ENTMCNC: 31.3 PG
MCHC RBC-ENTMCNC: 33.3 GM/DL
MCV RBC AUTO: 93.9 FL
MONOCYTES # BLD AUTO: 0.58 K/UL
MONOCYTES NFR BLD AUTO: 7.9 %
NEUTROPHILS # BLD AUTO: 5.19 K/UL
NEUTROPHILS NFR BLD AUTO: 70.7 %
NITRITE URINE: NEGATIVE
NONHDLC SERPL-MCNC: 141 MG/DL
PH URINE: 7
PLATELET # BLD AUTO: 316 K/UL
POTASSIUM SERPL-SCNC: 4.2 MMOL/L
PROT SERPL-MCNC: 7.1 G/DL
PROTEIN URINE: NORMAL
RBC # BLD: 3.1 M/UL
RBC # FLD: 12 %
SODIUM SERPL-SCNC: 136 MMOL/L
SPECIFIC GRAVITY URINE: 1.01
T3RU NFR SERPL: 1.1 TBI
T4 SERPL-MCNC: 7.5 UG/DL
TRIGL SERPL-MCNC: 113 MG/DL
TSH SERPL-ACNC: 2.41 UIU/ML
URATE SERPL-MCNC: 6 MG/DL
UROBILINOGEN URINE: NORMAL
WBC # FLD AUTO: 7.33 K/UL

## 2022-12-22 ENCOUNTER — APPOINTMENT (OUTPATIENT)
Dept: INTERNAL MEDICINE | Facility: CLINIC | Age: 67
End: 2022-12-22

## 2022-12-22 PROCEDURE — 77080 DXA BONE DENSITY AXIAL: CPT

## 2022-12-27 ENCOUNTER — RX RENEWAL (OUTPATIENT)
Age: 67
End: 2022-12-27

## 2023-01-19 ENCOUNTER — APPOINTMENT (OUTPATIENT)
Dept: OPHTHALMOLOGY | Facility: CLINIC | Age: 68
End: 2023-01-19
Payer: MEDICARE

## 2023-01-19 ENCOUNTER — NON-APPOINTMENT (OUTPATIENT)
Age: 68
End: 2023-01-19

## 2023-01-19 PROCEDURE — 92014 COMPRE OPH EXAM EST PT 1/>: CPT

## 2023-01-27 ENCOUNTER — RX RENEWAL (OUTPATIENT)
Age: 68
End: 2023-01-27

## 2023-02-14 ENCOUNTER — APPOINTMENT (OUTPATIENT)
Dept: GASTROENTEROLOGY | Facility: CLINIC | Age: 68
End: 2023-02-14
Payer: MEDICARE

## 2023-02-14 VITALS
HEART RATE: 95 BPM | TEMPERATURE: 97.3 F | WEIGHT: 115 LBS | SYSTOLIC BLOOD PRESSURE: 138 MMHG | HEIGHT: 60 IN | DIASTOLIC BLOOD PRESSURE: 82 MMHG | BODY MASS INDEX: 22.58 KG/M2 | OXYGEN SATURATION: 96 %

## 2023-02-14 DIAGNOSIS — C64.9 MALIGNANT NEOPLASM OF UNSPECIFIED KIDNEY, EXCEPT RENAL PELVIS: ICD-10-CM

## 2023-02-14 DIAGNOSIS — Z01.812 ENCOUNTER FOR PREPROCEDURAL LABORATORY EXAMINATION: ICD-10-CM

## 2023-02-14 DIAGNOSIS — Z20.822 ENCOUNTER FOR PREPROCEDURAL LABORATORY EXAMINATION: ICD-10-CM

## 2023-02-14 PROCEDURE — 99204 OFFICE O/P NEW MOD 45 MIN: CPT

## 2023-02-14 RX ORDER — BELZUTIFAN 40 MG/1
40 TABLET, FILM COATED ORAL
Refills: 0 | Status: ACTIVE | COMMUNITY

## 2023-02-14 RX ORDER — NIRMATRELVIR AND RITONAVIR 150-100 MG
10 X 150 MG & KIT ORAL
Qty: 20 | Refills: 0 | Status: DISCONTINUED | COMMUNITY
Start: 2022-12-28 | End: 2023-02-14

## 2023-02-14 RX ORDER — FERROUS GLUCONATE 324(37.5)
324 (37.5 FE) TABLET ORAL
Refills: 0 | Status: ACTIVE | COMMUNITY

## 2023-02-17 PROBLEM — Z01.812 ENCOUNTER FOR PREPROCEDURE SCREENING LABORATORY TESTING FOR COVID-19: Status: ACTIVE | Noted: 2023-02-14

## 2023-02-17 NOTE — ASSESSMENT
[FreeTextEntry1] : Von Hippel Landau disease and anemia.  Patient's hemoglobin /hematocrit appears to have decreased when she was started on the medication at the [facility] and has remained stable over the past few years.  However,  there is a concern about occult blood loss.  She is on iron supplementation.  She has had 2 negative colonoscopies the most recent one being in 2019 when she had the anemia that was unremarkable.  I would recommend she have a FIT test and an EGD at the present time.  If the FIT test is positive and the EGD unremarkable then we would consider repeating the colonoscopy.  This was discussed with the patient.  She understands and all questions were answered.

## 2023-02-17 NOTE — HISTORY OF PRESENT ILLNESS
[FreeTextEntry1] : 67-year-old woman who was followed at the [facility] for von Hippel-Lindau syndrome.  She is on an experimental drug a VEGF inhibitor with excellent results,  prescribed at the [facility] for this problem.  She was told she needs a GI evaluation for anemia.  Her hemoglobin has been in the range of 9-10 since starting the experimental drug several years ago and  has remained stable.  She believes she was told she has a decreased iron level but did not have the results.  She denies any rectal bleeding, melena or hematemesis.  She underwent a  colonoscopy in November 2019 for iron deficiency anemia elsewhere that showed diverticulosis and internal hemorrhoids on retroflexion.  A colonoscopy in 2014 was also negative.  Her other problems are any history of renal carcinoma pretension and hypercholesterolemia.  Patient is under the impression that her anemia is due to the medication she is taking that was prescribed at the [facility]..  She has never undergone an EGD.

## 2023-02-17 NOTE — CONSULT LETTER
[Dear  ___] : Dear  [unfilled], [Consult Letter:] : I had the pleasure of evaluating your patient, [unfilled]. [Please see my note below.] : Please see my note below. [Consult Closing:] : Thank you very much for allowing me to participate in the care of this patient.  If you have any questions, please do not hesitate to contact me. [Sincerely,] : Sincerely, [FreeTextEntry3] : Gurmeet Zamora MD, FACG\par

## 2023-02-17 NOTE — PHYSICAL EXAM
[Alert] : alert [Normal Voice/Communication] : normal voice/communication [Healthy Appearing] : healthy appearing [No Acute Distress] : no acute distress [Sclera] : the sclera and conjunctiva were normal [Hearing Threshold Finger Rub Not Cross] : hearing was normal [Normal Lips/Gums] : the lips and gums were normal [Oropharynx] : the oropharynx was normal [Normal Appearance] : the appearance of the neck was normal [No Neck Mass] : no neck mass was observed [No Respiratory Distress] : no respiratory distress [No Acc Muscle Use] : no accessory muscle use [Respiration, Rhythm And Depth] : normal respiratory rhythm and effort [Auscultation Breath Sounds / Voice Sounds] : lungs were clear to auscultation bilaterally [Heart Rate And Rhythm] : heart rate was normal and rhythm regular [Normal S1, S2] : normal S1 and S2 [Murmurs] : no murmurs [None] : no edema [Bowel Sounds] : normal bowel sounds [Abdomen Tenderness] : non-tender [No Masses] : no abdominal mass palpated [Abdomen Soft] : soft [] : no hepatosplenomegaly [Cervical Lymph Nodes Enlarged Posterior Bilaterally] : no posterior cervical lymphadenopathy [Supraclavicular Lymph Nodes Enlarged Bilaterally] : no supraclavicular lymphadenopathy [No CVA Tenderness] : no CVA  tenderness [Abnormal Walk] : normal gait [Normal] : oriented to person, place, and time [Oriented To Time, Place, And Person] : oriented to person, place, and time

## 2023-02-26 LAB — HEMOCCULT STL QL IA: NEGATIVE

## 2023-03-08 ENCOUNTER — RX RENEWAL (OUTPATIENT)
Age: 68
End: 2023-03-08

## 2023-03-20 ENCOUNTER — APPOINTMENT (OUTPATIENT)
Dept: INTERNAL MEDICINE | Facility: CLINIC | Age: 68
End: 2023-03-20
Payer: MEDICARE

## 2023-03-20 VITALS
SYSTOLIC BLOOD PRESSURE: 138 MMHG | BODY MASS INDEX: 22.78 KG/M2 | WEIGHT: 116 LBS | DIASTOLIC BLOOD PRESSURE: 82 MMHG | HEIGHT: 60 IN

## 2023-03-20 DIAGNOSIS — E61.1 IRON DEFICIENCY: ICD-10-CM

## 2023-03-20 PROCEDURE — 99214 OFFICE O/P EST MOD 30 MIN: CPT

## 2023-03-20 NOTE — HISTORY OF PRESENT ILLNESS
[FreeTextEntry1] : This is a 67-year-old female for evaluation and treatment of her hypertension iron deficiency anemia von Hippel-Lindau and palpitations [de-identified] : Patient is feeling well.  She is bit concerned at the fact that there are some studies that show that her veg F inhibitor is not universally effective.  However it has been effective for her\par \par She is in the midst of a GI work-up

## 2023-03-20 NOTE — PHYSICAL EXAM
[Normal] : normal rate, regular rhythm, normal S1 and S2 and no murmur heard [Coordination Grossly Intact] : coordination grossly intact [de-identified] : Anxious

## 2023-03-20 NOTE — ASSESSMENT
[FreeTextEntry1] : This is a delightful 67-year-old female whose history has been reviewed above\par \par She has a history of von Hippel-Lindau she remains under the care of the [facility] [place] and on a veg F inhibitor.  This is demonstrated diminishment in her various lesions.  She will be evaluated by them in several weeks and I will review the reports\par \par She has a history of hypercholesterolemia she remains on atorvastatin 20 mg cholesterol profile will be done at the [facility] [place] this will be forwarded to me and we will make changes predicated on the results\par \par She does have iron deficiency anemia she has had work-ups for this in the past her GI consultation was reviewed.  She did have a fit test which was negative she has had recent colonoscopies.  She will be scheduled for a gastroscopy\par \par Unfortunately she was unable to tolerate the iron that was prescribed by the [facility] [place] I told her to take Slow Fe might be easier for her to absorb.  CBC will be obtained at the [facility] [place] and again reviewed\par \par She has a history of hypertension currently she is maintained on amlodipine 5 twice a day as well as nebivolol 5 mg and hydrochlorothiazide.  She has lability in her blood pressure and gets anxious quite easily.  Her blood pressure was above goal when she initially came in however she did eventually reach close to goal she certainly was under 136 on her last reading.  I encouraged her to watch her weight salt and exercise

## 2023-04-07 NOTE — ED PROVIDER NOTE - PSYCHIATRIC, MLM
Alert and oriented to person, place, time/situation. normal mood and affect. no apparent risk to self or others.
Swelling and pain X  1 week to top or right hand, radiates to wrist, denies any fall or injury, c/o of limited movement to right  hand due to pain, + radial pulses, good cap refill, mild tenderness on palpation, decreased ROM.  PMHx Arthritis, automimmude dse, Asthma, HTN, Sciatica, chronic back and joint pains, ambulates w/ cane and walker, taking Gabapentin, Oxycodone and muscle relaxant with no relief.

## 2023-04-24 ENCOUNTER — RX RENEWAL (OUTPATIENT)
Age: 68
End: 2023-04-24

## 2023-04-25 ENCOUNTER — RX RENEWAL (OUTPATIENT)
Age: 68
End: 2023-04-25

## 2023-05-02 ENCOUNTER — APPOINTMENT (OUTPATIENT)
Dept: GASTROENTEROLOGY | Facility: CLINIC | Age: 68
End: 2023-05-02

## 2023-05-17 ENCOUNTER — APPOINTMENT (OUTPATIENT)
Dept: GASTROENTEROLOGY | Facility: AMBULATORY MEDICAL SERVICES | Age: 68
End: 2023-05-17
Payer: MEDICARE

## 2023-05-17 PROCEDURE — 43239 EGD BIOPSY SINGLE/MULTIPLE: CPT

## 2023-05-24 ENCOUNTER — APPOINTMENT (OUTPATIENT)
Dept: OPHTHALMOLOGY | Facility: CLINIC | Age: 68
End: 2023-05-24
Payer: MEDICARE

## 2023-05-24 ENCOUNTER — NON-APPOINTMENT (OUTPATIENT)
Age: 68
End: 2023-05-24

## 2023-05-24 PROCEDURE — 92083 EXTENDED VISUAL FIELD XM: CPT

## 2023-05-24 PROCEDURE — 92020 GONIOSCOPY: CPT

## 2023-05-24 PROCEDURE — 92014 COMPRE OPH EXAM EST PT 1/>: CPT

## 2023-05-24 PROCEDURE — 92250 FUNDUS PHOTOGRAPHY W/I&R: CPT

## 2023-06-13 ENCOUNTER — APPOINTMENT (OUTPATIENT)
Dept: GASTROENTEROLOGY | Facility: CLINIC | Age: 68
End: 2023-06-13
Payer: MEDICARE

## 2023-06-13 VITALS
HEART RATE: 95 BPM | DIASTOLIC BLOOD PRESSURE: 85 MMHG | SYSTOLIC BLOOD PRESSURE: 144 MMHG | BODY MASS INDEX: 21.71 KG/M2 | TEMPERATURE: 97.8 F | HEIGHT: 61 IN | WEIGHT: 115 LBS | OXYGEN SATURATION: 97 %

## 2023-06-13 DIAGNOSIS — K29.70 GASTRITIS, UNSPECIFIED, W/OUT BLEEDING: ICD-10-CM

## 2023-06-13 DIAGNOSIS — B96.81 GASTRITIS, UNSPECIFIED, W/OUT BLEEDING: ICD-10-CM

## 2023-06-13 PROCEDURE — 99215 OFFICE O/P EST HI 40 MIN: CPT

## 2023-06-13 NOTE — HISTORY OF PRESENT ILLNESS
[FreeTextEntry1] : 68-year-old woman with von Hippel-Lindau disease referred for anemia.  FIT test was negative.  She underwent an EGD and biopsy on May 17 that showed mild gastritis.  Gastric biopsy was positive for H. pylori.

## 2023-06-13 NOTE — ASSESSMENT
[FreeTextEntry1] : H. pylori gastritis and high risk patient with von Hippel-Lindau disease. Dietary and lifestyle modification discussed with the patient.  Patient will be given triple therapy regimen for treatment of the H. pylori.  Follow-up H. pylori breath test 6 weeks after completing treatment to determine eradication.  She will need to hold the atorvastatin while she is taking the clarithromycin.  Patient is to contact the [facility] to confirm that there is no problem with her taking the treatment for the H. pylori.  This was discussed with the patient.  She understands and all questions were answered\par

## 2023-06-17 ENCOUNTER — RX RENEWAL (OUTPATIENT)
Age: 68
End: 2023-06-17

## 2023-07-21 ENCOUNTER — RX RENEWAL (OUTPATIENT)
Age: 68
End: 2023-07-21

## 2023-07-24 ENCOUNTER — LABORATORY RESULT (OUTPATIENT)
Age: 68
End: 2023-07-24

## 2023-07-24 ENCOUNTER — APPOINTMENT (OUTPATIENT)
Dept: INTERNAL MEDICINE | Facility: CLINIC | Age: 68
End: 2023-07-24
Payer: MEDICARE

## 2023-07-24 VITALS
BODY MASS INDEX: 21.43 KG/M2 | SYSTOLIC BLOOD PRESSURE: 120 MMHG | HEIGHT: 61 IN | DIASTOLIC BLOOD PRESSURE: 72 MMHG | WEIGHT: 113.5 LBS

## 2023-07-24 DIAGNOSIS — N63.0 UNSPECIFIED LUMP IN UNSPECIFIED BREAST: ICD-10-CM

## 2023-07-24 PROCEDURE — 99214 OFFICE O/P EST MOD 30 MIN: CPT | Mod: 25

## 2023-07-24 PROCEDURE — 36415 COLL VENOUS BLD VENIPUNCTURE: CPT

## 2023-07-24 RX ORDER — CLARITHROMYCIN 500 MG/1
500 TABLET, FILM COATED ORAL TWICE DAILY
Qty: 20 | Refills: 0 | Status: DISCONTINUED | COMMUNITY
Start: 2023-06-13 | End: 2023-07-24

## 2023-07-24 RX ORDER — AMOXICILLIN 500 MG/1
500 TABLET, FILM COATED ORAL TWICE DAILY
Qty: 40 | Refills: 0 | Status: DISCONTINUED | COMMUNITY
Start: 2023-06-13 | End: 2023-07-24

## 2023-07-24 RX ORDER — ATORVASTATIN CALCIUM 10 MG/1
10 TABLET, FILM COATED ORAL
Qty: 90 | Refills: 1 | Status: DISCONTINUED | COMMUNITY
Start: 2022-12-21 | End: 2023-07-24

## 2023-07-24 RX ORDER — OMEPRAZOLE 20 MG/1
20 CAPSULE, DELAYED RELEASE ORAL
Qty: 20 | Refills: 1 | Status: DISCONTINUED | COMMUNITY
Start: 2023-06-13 | End: 2023-07-24

## 2023-07-24 NOTE — ASSESSMENT
[FreeTextEntry1] : This is a delightful 68-year-old female whose history has been reviewed above\par \par She has a history of hypertension she remains on amlodipine nebivolol hydrochlorothiazide and potassium.  Her blood pressure is under excellent control she has no orthostasis appropriate blood tests including electrolytes were obtained\par \par She has a history of hypercholesterolemia she remains on atorvastatin 30 (however she has not taken it in 10 days) I did suggest that we postpone the blood test but she wants to know what her baseline is so we will draw cholesterol profile and we will recommend pending results\par \par She does have von Hippel-Lindau she is followed at the [facility] apparently her kidneys have shown no neoplastic change.  But a breast mass and a pancreatic mass were noted both felt to be benign and to be watched she is scheduled for a mammography in November and I will try to find the exact nature of the lesions\par \par She has a history of anemia this has been secondary to her medications we did repeat her CBC\par \par She will be seeing endocrinology for her osteoporosis\par \par I did review the l lesions quite questionable she will follow-up with a mammography and at [facility]

## 2023-07-27 LAB
ALBUMIN SERPL ELPH-MCNC: 5 G/DL
ALP BLD-CCNC: 59 U/L
ALT SERPL-CCNC: 18 U/L
ANION GAP SERPL CALC-SCNC: 15 MMOL/L
AST SERPL-CCNC: 26 U/L
BILIRUB SERPL-MCNC: 0.2 MG/DL
BUN SERPL-MCNC: 36 MG/DL
CALCIUM SERPL-MCNC: 10.7 MG/DL
CALCIUM SERPL-MCNC: 10.8 MG/DL
CHLORIDE SERPL-SCNC: 93 MMOL/L
CHOLEST SERPL-MCNC: 287 MG/DL
CO2 SERPL-SCNC: 29 MMOL/L
CREAT SERPL-MCNC: 1.22 MG/DL
EGFR: 48 ML/MIN/1.73M2
ESTIMATED AVERAGE GLUCOSE: 123 MG/DL
GLUCOSE SERPL-MCNC: 103 MG/DL
HBA1C MFR BLD HPLC: 5.9 %
HDLC SERPL-MCNC: 62 MG/DL
LDLC SERPL CALC-MCNC: 193 MG/DL
NONHDLC SERPL-MCNC: 224 MG/DL
PARATHYROID HORMONE INTACT: 21 PG/ML
POTASSIUM SERPL-SCNC: 3.5 MMOL/L
PROT SERPL-MCNC: 7.3 G/DL
SODIUM SERPL-SCNC: 137 MMOL/L
T3RU NFR SERPL: 1 TBI
T4 SERPL-MCNC: 7.2 UG/DL
TRIGL SERPL-MCNC: 171 MG/DL
TSH SERPL-ACNC: 3.51 UIU/ML
URATE SERPL-MCNC: 6.1 MG/DL

## 2023-07-28 NOTE — HISTORY OF PRESENT ILLNESS
Spoke over the phone with Patient regarding No Call - No Show. Patient was scheduled for 9:45 am this morning. Patient states she will be out of town for a family reunion and is leaving today. Patient states she thought she canceled today's visit when she was here last. Confirmed next appointment Friday 8/4/2023. Offered Patient opportunity to be seen by another provider earlier in the week. Patient politely declines.   [FreeTextEntry1] : This is a 68-year-old female for evaluation and treatment of her hypertension hypercholesterolemia anemia.  In addition she has von Hippel-Lindau syndrome and is followed for renal carcinoma [de-identified] : Patient is feeling well.  However she did see the [facility] and a small breast mass that was noted apparently stable for 3 years.  Also something was noted on the pancreas

## 2023-08-22 LAB — CALCIUM SERPL-MCNC: 9.7 MG/DL

## 2023-09-12 ENCOUNTER — APPOINTMENT (OUTPATIENT)
Dept: GASTROENTEROLOGY | Facility: CLINIC | Age: 68
End: 2023-09-12
Payer: MEDICARE

## 2023-09-12 ENCOUNTER — LABORATORY RESULT (OUTPATIENT)
Age: 68
End: 2023-09-12

## 2023-09-12 VITALS
DIASTOLIC BLOOD PRESSURE: 87 MMHG | SYSTOLIC BLOOD PRESSURE: 135 MMHG | HEART RATE: 76 BPM | WEIGHT: 114 LBS | TEMPERATURE: 97.1 F | HEIGHT: 60 IN | BODY MASS INDEX: 22.38 KG/M2 | OXYGEN SATURATION: 97 %

## 2023-09-12 PROCEDURE — 83014 H PYLORI DRUG ADMIN: CPT

## 2023-09-12 RX ORDER — IBUPROFEN 200 MG
600 CAPSULE ORAL
Refills: 0 | Status: DISCONTINUED | COMMUNITY
End: 2023-09-12

## 2023-10-13 ENCOUNTER — LABORATORY RESULT (OUTPATIENT)
Age: 68
End: 2023-10-13

## 2023-10-13 ENCOUNTER — APPOINTMENT (OUTPATIENT)
Dept: INTERNAL MEDICINE | Facility: CLINIC | Age: 68
End: 2023-10-13
Payer: MEDICARE

## 2023-10-13 VITALS — SYSTOLIC BLOOD PRESSURE: 122 MMHG | DIASTOLIC BLOOD PRESSURE: 70 MMHG

## 2023-10-13 VITALS
HEIGHT: 60 IN | DIASTOLIC BLOOD PRESSURE: 72 MMHG | WEIGHT: 116 LBS | BODY MASS INDEX: 22.78 KG/M2 | SYSTOLIC BLOOD PRESSURE: 120 MMHG

## 2023-10-13 DIAGNOSIS — K86.9 DISEASE OF PANCREAS, UNSPECIFIED: ICD-10-CM

## 2023-10-13 DIAGNOSIS — Q85.83 VON HIPPEL-LINDAU SYNDROME: ICD-10-CM

## 2023-10-13 PROCEDURE — 36415 COLL VENOUS BLD VENIPUNCTURE: CPT

## 2023-10-13 PROCEDURE — 99214 OFFICE O/P EST MOD 30 MIN: CPT | Mod: 25

## 2023-10-15 ENCOUNTER — RX RENEWAL (OUTPATIENT)
Age: 68
End: 2023-10-15

## 2023-10-15 RX ORDER — NEBIVOLOL 5 MG/1
5 TABLET ORAL
Qty: 90 | Refills: 3 | Status: ACTIVE | COMMUNITY
Start: 2021-11-30 | End: 1900-01-01

## 2023-10-16 ENCOUNTER — RX RENEWAL (OUTPATIENT)
Age: 68
End: 2023-10-16

## 2023-10-16 LAB
ALBUMIN SERPL ELPH-MCNC: 5 G/DL
ALP BLD-CCNC: 67 U/L
ALT SERPL-CCNC: 15 U/L
ANION GAP SERPL CALC-SCNC: 15 MMOL/L
AST SERPL-CCNC: 21 U/L
BASOPHILS # BLD AUTO: 0.07 K/UL
BASOPHILS NFR BLD AUTO: 1.1 %
BILIRUB SERPL-MCNC: 0.3 MG/DL
BUN SERPL-MCNC: 33 MG/DL
CALCIUM SERPL-MCNC: 9.9 MG/DL
CHLORIDE SERPL-SCNC: 93 MMOL/L
CHOLEST SERPL-MCNC: 199 MG/DL
CO2 SERPL-SCNC: 27 MMOL/L
CREAT SERPL-MCNC: 1.07 MG/DL
EGFR: 57 ML/MIN/1.73M2
EOSINOPHIL # BLD AUTO: 0.18 K/UL
EOSINOPHIL NFR BLD AUTO: 2.8 %
ESTIMATED AVERAGE GLUCOSE: 114 MG/DL
GLUCOSE SERPL-MCNC: 98 MG/DL
HBA1C MFR BLD HPLC: 5.6 %
HCT VFR BLD CALC: 29.5 %
HDLC SERPL-MCNC: 63 MG/DL
HGB BLD-MCNC: 9.6 G/DL
IMM GRANULOCYTES NFR BLD AUTO: 0.3 %
LDLC SERPL CALC-MCNC: 117 MG/DL
LYMPHOCYTES # BLD AUTO: 1.3 K/UL
LYMPHOCYTES NFR BLD AUTO: 20.3 %
MAN DIFF?: NORMAL
MCHC RBC-ENTMCNC: 31.3 PG
MCHC RBC-ENTMCNC: 32.5 GM/DL
MCV RBC AUTO: 96.1 FL
MONOCYTES # BLD AUTO: 0.5 K/UL
MONOCYTES NFR BLD AUTO: 7.8 %
NEUTROPHILS # BLD AUTO: 4.34 K/UL
NEUTROPHILS NFR BLD AUTO: 67.7 %
NONHDLC SERPL-MCNC: 137 MG/DL
PLATELET # BLD AUTO: 300 K/UL
POTASSIUM SERPL-SCNC: 3.7 MMOL/L
PROT SERPL-MCNC: 7.5 G/DL
RBC # BLD: 3.07 M/UL
RBC # FLD: 12.2 %
SODIUM SERPL-SCNC: 135 MMOL/L
T3RU NFR SERPL: 1 TBI
T4 SERPL-MCNC: 7.6 UG/DL
TRIGL SERPL-MCNC: 113 MG/DL
TSH SERPL-ACNC: 2.35 UIU/ML
URATE SERPL-MCNC: 5.5 MG/DL
WBC # FLD AUTO: 6.41 K/UL

## 2023-10-16 RX ORDER — ATORVASTATIN CALCIUM 10 MG/1
10 TABLET, FILM COATED ORAL
Qty: 90 | Refills: 1 | Status: DISCONTINUED | COMMUNITY
Start: 2021-06-21 | End: 2023-10-16

## 2023-10-30 ENCOUNTER — NON-APPOINTMENT (OUTPATIENT)
Age: 68
End: 2023-10-30

## 2023-10-30 ENCOUNTER — APPOINTMENT (OUTPATIENT)
Dept: OPHTHALMOLOGY | Facility: CLINIC | Age: 68
End: 2023-10-30
Payer: MEDICARE

## 2023-10-30 PROCEDURE — 92012 INTRM OPH EXAM EST PATIENT: CPT

## 2023-10-30 PROCEDURE — 92133 CPTRZD OPH DX IMG PST SGM ON: CPT

## 2023-10-30 PROCEDURE — 92083 EXTENDED VISUAL FIELD XM: CPT

## 2023-11-22 ENCOUNTER — NON-APPOINTMENT (OUTPATIENT)
Age: 68
End: 2023-11-22

## 2023-11-26 ENCOUNTER — RX RENEWAL (OUTPATIENT)
Age: 68
End: 2023-11-26

## 2023-12-22 ENCOUNTER — LABORATORY RESULT (OUTPATIENT)
Age: 68
End: 2023-12-22

## 2023-12-22 ENCOUNTER — NON-APPOINTMENT (OUTPATIENT)
Age: 68
End: 2023-12-22

## 2023-12-22 ENCOUNTER — APPOINTMENT (OUTPATIENT)
Dept: INTERNAL MEDICINE | Facility: CLINIC | Age: 68
End: 2023-12-22
Payer: MEDICARE

## 2023-12-22 VITALS
WEIGHT: 118.38 LBS | HEIGHT: 60 IN | DIASTOLIC BLOOD PRESSURE: 78 MMHG | BODY MASS INDEX: 23.24 KG/M2 | SYSTOLIC BLOOD PRESSURE: 138 MMHG

## 2023-12-22 VITALS — DIASTOLIC BLOOD PRESSURE: 78 MMHG | SYSTOLIC BLOOD PRESSURE: 138 MMHG

## 2023-12-22 DIAGNOSIS — Z00.00 ENCOUNTER FOR GENERAL ADULT MEDICAL EXAMINATION W/OUT ABNORMAL FINDINGS: ICD-10-CM

## 2023-12-22 DIAGNOSIS — M81.0 AGE-RELATED OSTEOPOROSIS W/OUT CURRENT PATHOLOGICAL FRACTURE: ICD-10-CM

## 2023-12-22 DIAGNOSIS — A04.8 OTHER SPECIFIED BACTERIAL INTESTINAL INFECTIONS: ICD-10-CM

## 2023-12-22 DIAGNOSIS — I10 ESSENTIAL (PRIMARY) HYPERTENSION: ICD-10-CM

## 2023-12-22 PROCEDURE — 36415 COLL VENOUS BLD VENIPUNCTURE: CPT

## 2023-12-22 PROCEDURE — 99212 OFFICE O/P EST SF 10 MIN: CPT | Mod: 25

## 2023-12-22 PROCEDURE — 90677 PCV20 VACCINE IM: CPT

## 2023-12-22 PROCEDURE — G0009: CPT

## 2023-12-22 PROCEDURE — G0439: CPT

## 2023-12-22 PROCEDURE — 93000 ELECTROCARDIOGRAM COMPLETE: CPT

## 2023-12-22 NOTE — PHYSICAL EXAM
[No Acute Distress] : no acute distress [Well Nourished] : well nourished [Well Developed] : well developed [Well-Appearing] : well-appearing [Normal Sclera/Conjunctiva] : normal sclera/conjunctiva [PERRL] : pupils equal round and reactive to light [EOMI] : extraocular movements intact [Normal Outer Ear/Nose] : the outer ears and nose were normal in appearance [Normal Oropharynx] : the oropharynx was normal [No JVD] : no jugular venous distention [No Lymphadenopathy] : no lymphadenopathy [Supple] : supple [Thyroid Normal, No Nodules] : the thyroid was normal and there were no nodules present [No Respiratory Distress] : no respiratory distress  [No Accessory Muscle Use] : no accessory muscle use [Clear to Auscultation] : lungs were clear to auscultation bilaterally [Normal Rate] : normal rate  [Regular Rhythm] : with a regular rhythm [Normal S1, S2] : normal S1 and S2 [No Murmur] : no murmur heard [No Carotid Bruits] : no carotid bruits [No Abdominal Bruit] : a ~M bruit was not heard ~T in the abdomen [No Varicosities] : no varicosities [Pedal Pulses Present] : the pedal pulses are present [No Edema] : there was no peripheral edema [No Palpable Aorta] : no palpable aorta [No Extremity Clubbing/Cyanosis] : no extremity clubbing/cyanosis [Normal Appearance] : normal in appearance [No Nipple Discharge] : no nipple discharge [No Axillary Lymphadenopathy] : no axillary lymphadenopathy [Soft] : abdomen soft [Non Tender] : non-tender [Non-distended] : non-distended [No Masses] : no abdominal mass palpated [No HSM] : no HSM [Normal Bowel Sounds] : normal bowel sounds [Normal Posterior Cervical Nodes] : no posterior cervical lymphadenopathy [Normal Anterior Cervical Nodes] : no anterior cervical lymphadenopathy [No CVA Tenderness] : no CVA  tenderness [No Spinal Tenderness] : no spinal tenderness [No Joint Swelling] : no joint swelling [Grossly Normal Strength/Tone] : grossly normal strength/tone [No Rash] : no rash [Coordination Grossly Intact] : coordination grossly intact [No Focal Deficits] : no focal deficits [Deep Tendon Reflexes (DTR)] : deep tendon reflexes were 2+ and symmetric [Normal Affect] : the affect was normal [Normal Insight/Judgement] : insight and judgment were intact [de-identified] : Difficulty with heel-to-toe

## 2023-12-22 NOTE — ASSESSMENT
[FreeTextEntry1] : This is a 68-year-old female whose history has been reviewed above  She has a history of hypertension her blood pressure is under good control she has no orthostasis we will continue her on amlodipine hydrochlorothiazide and potassium appropriate electrolytes were drawn  She has a history of hypercholesterolemia she remains on atorvastatin 40 mg cholesterol profile has been obtained medication changes predicated on the results  She has a history of von Hippel-Lindau disease she is followed by the [facility] she remains on a veg F inhibitor with excellent results.  She does have a renal neoplasm.  She has 3-4 removed in the past she had imaging last month which showed decrease in her lesions.  She has had no CNS findings  She does have anemia associated with her therapy we will continue to monitor CBC  She has an appointment with OB/GYN she is up-to-date with mammography she did have a negative breast biopsy.  She does have a tubular adenoma follows up with colonoscopy.  She is up-to-date with COVID flu vaccinations she will be given a Prevnar.  Otherwise she is up-to-date with vaccinations  She did have H. pylori treated approximately 6 months ago and then followed up and tested negative  She also has difficulty reaching orgasm we discussed some possibilities and she will discuss this with OB/GYN as well.  She has no change in libido  She has 2 new small issues she has developed a blister on her foot this was lanced by podiatry but is still giving her some discomfort.  On inspection there is no infection no evidence of an opening I asked her to add warm soaks possibly padding to ease the pain if it continues to let me know  She did have difficulty with heel-to-toe we added a B12 level to her blood tests.  She does have a indwelling tube in her ear I did ask her to speak to her ENT doctor to see if this could be playing a role and whether or not this should be removed  Her EKG remains normal  She is now on Prolia for osteoporosis She is no longer having chest pain shortness of breath and her glaucoma seems to be under control She will attempt to stay lean and exercise

## 2023-12-22 NOTE — HEALTH RISK ASSESSMENT
[Very Good] : ~his/her~  mood as very good [Yes] : Yes [No falls in past year] : Patient reported no falls in the past year [0] : 2) Feeling down, depressed, or hopeless: Not at all (0) [PHQ-2 Negative - No further assessment needed] : PHQ-2 Negative - No further assessment needed [[id number]] : 0 [Change in mental status noted] : No change in mental status noted [None] : None [With Significant Other] : lives with significant other [Employed] : employed [College] : College [] :  [Sexually Active] : sexually active [Feels Safe at Home] : Feels safe at home [Fully functional (bathing, dressing, toileting, transferring, walking, feeding)] : Fully functional (bathing, dressing, toileting, transferring, walking, feeding) [Fully functional (using the telephone, shopping, preparing meals, housekeeping, doing laundry, using] : Fully functional and needs no help or supervision to perform IADLs (using the telephone, shopping, preparing meals, housekeeping, doing laundry, using transportation, managing medications and managing finances) [Reports changes in hearing] : Reports no changes in hearing [Reports changes in vision] : Reports no changes in vision [Reports changes in dental health] : Reports no changes in dental health [Smoke Detector] : smoke detector [Carbon Monoxide Detector] : carbon monoxide detector [Guns at Home] : no guns at home [Safety elements used in home] : no safety elements used in home [Seat Belt] :  uses seat belt [Sunscreen] : uses sunscreen [Travel to Developing Areas] : does not  travel to developing areas [TB Exposure] : is not being exposed to tuberculosis [Caregiver Concerns] : does not have caregiver concerns [I will adhere to the patient's wishes.] : I will adhere to the patient's wishes. [Never] : Never

## 2023-12-22 NOTE — HEALTH RISK ASSESSMENT
[Very Good] : ~his/her~  mood as very good [Yes] : Yes [No falls in past year] : Patient reported no falls in the past year [0] : 2) Feeling down, depressed, or hopeless: Not at all (0) [PHQ-2 Negative - No further assessment needed] : PHQ-2 Negative - No further assessment needed [[id number]] : 0 [Change in mental status noted] : No change in mental status noted [None] : None [With Significant Other] : lives with significant other [Employed] : employed [College] : College [] :  [Sexually Active] : sexually active [Feels Safe at Home] : Feels safe at home [Fully functional (bathing, dressing, toileting, transferring, walking, feeding)] : Fully functional (bathing, dressing, toileting, transferring, walking, feeding) [Fully functional (using the telephone, shopping, preparing meals, housekeeping, doing laundry, using] : Fully functional and needs no help or supervision to perform IADLs (using the telephone, shopping, preparing meals, housekeeping, doing laundry, using transportation, managing medications and managing finances) [Reports changes in hearing] : Reports no changes in hearing [Reports changes in vision] : Reports no changes in vision [Reports changes in dental health] : Reports no changes in dental health [Smoke Detector] : smoke detector [Carbon Monoxide Detector] : carbon monoxide detector [Guns at Home] : no guns at home [Safety elements used in home] : no safety elements used in home [Seat Belt] :  uses seat belt [Sunscreen] : uses sunscreen [Travel to Developing Areas] : does not  travel to developing areas [TB Exposure] : is not being exposed to tuberculosis [Caregiver Concerns] : does not have caregiver concerns [I will adhere to the patient's wishes.] : I will adhere to the patient's wishes. [Never] : Never

## 2023-12-22 NOTE — PHYSICAL EXAM
[No Acute Distress] : no acute distress [Well Nourished] : well nourished [Well Developed] : well developed [Well-Appearing] : well-appearing [Normal Sclera/Conjunctiva] : normal sclera/conjunctiva [PERRL] : pupils equal round and reactive to light [EOMI] : extraocular movements intact [Normal Outer Ear/Nose] : the outer ears and nose were normal in appearance [Normal Oropharynx] : the oropharynx was normal [No JVD] : no jugular venous distention [No Lymphadenopathy] : no lymphadenopathy [Supple] : supple [Thyroid Normal, No Nodules] : the thyroid was normal and there were no nodules present [No Respiratory Distress] : no respiratory distress  [No Accessory Muscle Use] : no accessory muscle use [Clear to Auscultation] : lungs were clear to auscultation bilaterally [Normal Rate] : normal rate  [Regular Rhythm] : with a regular rhythm [Normal S1, S2] : normal S1 and S2 [No Murmur] : no murmur heard [No Carotid Bruits] : no carotid bruits [No Abdominal Bruit] : a ~M bruit was not heard ~T in the abdomen [No Varicosities] : no varicosities [Pedal Pulses Present] : the pedal pulses are present [No Edema] : there was no peripheral edema [No Palpable Aorta] : no palpable aorta [No Extremity Clubbing/Cyanosis] : no extremity clubbing/cyanosis [Normal Appearance] : normal in appearance [No Nipple Discharge] : no nipple discharge [No Axillary Lymphadenopathy] : no axillary lymphadenopathy [Soft] : abdomen soft [Non Tender] : non-tender [Non-distended] : non-distended [No Masses] : no abdominal mass palpated [No HSM] : no HSM [Normal Bowel Sounds] : normal bowel sounds [Normal Posterior Cervical Nodes] : no posterior cervical lymphadenopathy [Normal Anterior Cervical Nodes] : no anterior cervical lymphadenopathy [No CVA Tenderness] : no CVA  tenderness [No Spinal Tenderness] : no spinal tenderness [No Joint Swelling] : no joint swelling [Grossly Normal Strength/Tone] : grossly normal strength/tone [No Rash] : no rash [Coordination Grossly Intact] : coordination grossly intact [No Focal Deficits] : no focal deficits [Deep Tendon Reflexes (DTR)] : deep tendon reflexes were 2+ and symmetric [Normal Affect] : the affect was normal [Normal Insight/Judgement] : insight and judgment were intact [de-identified] : Difficulty with heel-to-toe

## 2023-12-22 NOTE — ASSESSMENT
[FreeTextEntry1] : This is a 68-year-old female whose history has been reviewed above  She has a history of hypertension her blood pressure is under good control she has no orthostasis we will continue her on amlodipine hydrochlorothiazide and potassium appropriate electrolytes were drawn  She has a history of hypercholesterolemia she remains on atorvastatin 40 mg cholesterol profile has been obtained medication changes predicated on the results  She has a history of von Hippel-Lindau disease she is followed by the [facility] she remains on a veg F inhibitor with excellent results.  She does have a renal neoplasm.  She has 3-4 removed in the past she had imaging last month which showed decrease in her lesions.  She has had no CNS findings  She does have anemia associated with her therapy we will continue to monitor CBC  She has an appointment with OB/GYN she is up-to-date with mammography she did have a negative breast biopsy.  She does have a tubular adenoma follows up with colonoscopy.  She is up-to-date with COVID flu vaccinations she will be given a Prevnar.  Otherwise she is up-to-date with vaccinations  She did have H. pylori treated approximately 6 months ago and then followed up and tested negative  She also has difficulty reaching orgasm we discussed some possibilities and she will discuss this with OB/GYN as well.  She has no change in libido  She has 2 new small issues she has developed a blister on her foot this was lanced by podiatry but is still giving her some discomfort.  On inspection there is no infection no evidence of an opening I asked her to add warm soaks possibly padding to ease the pain if it continues to let me know  She did have difficulty with heel-to-toe we added a B12 level to her blood tests.  She does have a indwelling tube in her ear I did ask her to speak to her ENT doctor to see if this could be playing a role and whether or not this should be removed  Her EKG remains normal  She is now on Prolia for osteoporosis She is no longer having chest pain shortness of breath and her glaucoma seems to be under control She will attempt to stay lean and exercise

## 2023-12-23 LAB
25(OH)D3 SERPL-MCNC: 51.2 NG/ML
ALBUMIN SERPL ELPH-MCNC: 4.9 G/DL
ALP BLD-CCNC: 50 U/L
ALT SERPL-CCNC: 13 U/L
ANION GAP SERPL CALC-SCNC: 13 MMOL/L
APPEARANCE: CLEAR
AST SERPL-CCNC: 24 U/L
BASOPHILS # BLD AUTO: 0.09 K/UL
BASOPHILS NFR BLD AUTO: 1.4 %
BILIRUB SERPL-MCNC: 0.2 MG/DL
BILIRUBIN URINE: NEGATIVE
BLOOD URINE: NEGATIVE
BUN SERPL-MCNC: 33 MG/DL
CALCIUM SERPL-MCNC: 9.7 MG/DL
CHLORIDE SERPL-SCNC: 96 MMOL/L
CHOLEST SERPL-MCNC: 194 MG/DL
CO2 SERPL-SCNC: 27 MMOL/L
COLOR: YELLOW
CREAT SERPL-MCNC: 1.12 MG/DL
EGFR: 54 ML/MIN/1.73M2
EOSINOPHIL # BLD AUTO: 0.17 K/UL
EOSINOPHIL NFR BLD AUTO: 2.7 %
ESTIMATED AVERAGE GLUCOSE: 120 MG/DL
GLUCOSE QUALITATIVE U: NEGATIVE MG/DL
GLUCOSE SERPL-MCNC: 91 MG/DL
HBA1C MFR BLD HPLC: 5.8 %
HCT VFR BLD CALC: 28.4 %
HDLC SERPL-MCNC: 61 MG/DL
HGB BLD-MCNC: 9.2 G/DL
IMM GRANULOCYTES NFR BLD AUTO: 0.2 %
KETONES URINE: NEGATIVE MG/DL
LDLC SERPL CALC-MCNC: 113 MG/DL
LEUKOCYTE ESTERASE URINE: NEGATIVE
LYMPHOCYTES # BLD AUTO: 1.41 K/UL
LYMPHOCYTES NFR BLD AUTO: 22 %
MAN DIFF?: NORMAL
MCHC RBC-ENTMCNC: 31.1 PG
MCHC RBC-ENTMCNC: 32.4 GM/DL
MCV RBC AUTO: 95.9 FL
MONOCYTES # BLD AUTO: 0.56 K/UL
MONOCYTES NFR BLD AUTO: 8.8 %
NEUTROPHILS # BLD AUTO: 4.16 K/UL
NEUTROPHILS NFR BLD AUTO: 64.9 %
NITRITE URINE: NEGATIVE
NONHDLC SERPL-MCNC: 133 MG/DL
PH URINE: 6.5
PLATELET # BLD AUTO: 289 K/UL
POTASSIUM SERPL-SCNC: 4 MMOL/L
PROT SERPL-MCNC: 7 G/DL
PROTEIN URINE: NEGATIVE MG/DL
RBC # BLD: 2.96 M/UL
RBC # FLD: 12.2 %
SODIUM SERPL-SCNC: 136 MMOL/L
SPECIFIC GRAVITY URINE: 1.01
T3RU NFR SERPL: 1 TBI
T4 SERPL-MCNC: 7.8 UG/DL
TRIGL SERPL-MCNC: 111 MG/DL
TSH SERPL-ACNC: 3.37 UIU/ML
URATE SERPL-MCNC: 5.9 MG/DL
UROBILINOGEN URINE: 0.2 MG/DL
VIT B12 SERPL-MCNC: 702 PG/ML
WBC # FLD AUTO: 6.4 K/UL

## 2023-12-26 NOTE — HISTORY OF PRESENT ILLNESS
[de-identified] : Patient is feeling well she is upbeat she had a recent visit to the [facility] and was apparently given a clean bill of health.  She also had a breast biopsy which was negative [FreeTextEntry1] : This is a 68-year-old female for evaluation and treatment of her hypertension hypercholesterolemia osteopenia anemia and Tima von Hippel-Lindau disease

## 2023-12-26 NOTE — HISTORY OF PRESENT ILLNESS
[de-identified] : Patient is feeling well she is upbeat she had a recent visit to the [facility] and was apparently given a clean bill of health.  She also had a breast biopsy which was negative [FreeTextEntry1] : This is a 68-year-old female for evaluation and treatment of her hypertension hypercholesterolemia osteopenia anemia and Tima von Hippel-Lindau disease

## 2023-12-27 ENCOUNTER — TRANSCRIPTION ENCOUNTER (OUTPATIENT)
Age: 68
End: 2023-12-27

## 2024-01-07 ENCOUNTER — NON-APPOINTMENT (OUTPATIENT)
Age: 69
End: 2024-01-07

## 2024-01-07 ENCOUNTER — TRANSCRIPTION ENCOUNTER (OUTPATIENT)
Age: 69
End: 2024-01-07

## 2024-01-08 ENCOUNTER — APPOINTMENT (OUTPATIENT)
Dept: AFTER HOURS CARE | Facility: EMERGENCY ROOM | Age: 69
End: 2024-01-08
Payer: MEDICARE

## 2024-01-08 DIAGNOSIS — U07.1 COVID-19: ICD-10-CM

## 2024-01-08 PROCEDURE — 99203 OFFICE O/P NEW LOW 30 MIN: CPT

## 2024-01-08 NOTE — ASSESSMENT
[FreeTextEntry1] : COVID day #3 fully vaccinated with mild risk factors. Pro/cons of anti-viral treatment discussed. Pt would need to stop Wellrig to take Paxlovid which she is not inclined to due given she has had success with treatment. IV remdesivir also declined due to logistics. Discussed molnupiravir as option as well. Pt understood and declined in favor of supportive care as she is feeling well and has mild risk profile. Isolation protocol and ER precautions discussed.

## 2024-01-08 NOTE — HISTORY OF PRESENT ILLNESS
[Home] : at home, [unfilled] , at the time of the visit. [Other Location: e.g. Home (Enter Location, City,State)___] : at [unfilled] [Verbal consent obtained from patient] : the patient, [unfilled] [FreeTextEntry8] : 68F presetns with COVID day #3 Sx: Cough, chills No fever, no SOB, no N/V/D Taking Tylenol PRN with improvement One previous COVID-19 infection for which she received MAB without complication PMH: Von hippo-lindau syndrome (on Wellrig-belzutifan, followed by [facility]), HTN (Nebivolol, Amlodipine, HCTZ), glaucoma (cymbreta, timolol, lumigan) Vaccine status: COVID and influenza UTD  (REMS [facility])

## 2024-01-08 NOTE — PLAN
[No new medications perscribed] : Treat in place: No new medications prescribed [FreeTextEntry1] : COVID day #3 fully vaccinated with mild risk factors. Pro/cons of anti-viral treatment discussed. Pt would need to stop Wellrig to take Paxlovid which she is not inclined to due given she has had success with treatment. IV remdesivir also declined due to logistics. Discussed molnupiravir as option as well. Pt understood and declined in favor of supportive care as she is feeling well and has mild risk profile. Isolation protocol and ER precautions discussed.

## 2024-02-09 ENCOUNTER — APPOINTMENT (OUTPATIENT)
Dept: OBGYN | Facility: CLINIC | Age: 69
End: 2024-02-09
Payer: MEDICARE

## 2024-02-09 VITALS
SYSTOLIC BLOOD PRESSURE: 156 MMHG | DIASTOLIC BLOOD PRESSURE: 89 MMHG | HEIGHT: 60 IN | BODY MASS INDEX: 22.97 KG/M2 | WEIGHT: 117 LBS | HEART RATE: 79 BPM

## 2024-02-09 DIAGNOSIS — R92.8 OTHER ABNORMAL AND INCONCLUSIVE FINDINGS ON DIAGNOSTIC IMAGING OF BREAST: ICD-10-CM

## 2024-02-09 DIAGNOSIS — Z01.419 ENCOUNTER FOR GYNECOLOGICAL EXAMINATION (GENERAL) (ROUTINE) W/OUT ABNORMAL FINDINGS: ICD-10-CM

## 2024-02-09 PROCEDURE — G0101: CPT

## 2024-02-13 LAB — HPV HIGH+LOW RISK DNA PNL CVX: NOT DETECTED

## 2024-02-15 LAB — CYTOLOGY CVX/VAG DOC THIN PREP: ABNORMAL

## 2024-03-13 ENCOUNTER — RX RENEWAL (OUTPATIENT)
Age: 69
End: 2024-03-13

## 2024-03-13 RX ORDER — AMLODIPINE BESYLATE 5 MG/1
5 TABLET ORAL TWICE DAILY
Qty: 180 | Refills: 1 | Status: ACTIVE | COMMUNITY
Start: 2020-08-12 | End: 1900-01-01

## 2024-03-22 ENCOUNTER — RX RENEWAL (OUTPATIENT)
Age: 69
End: 2024-03-22

## 2024-03-22 RX ORDER — HYDROCHLOROTHIAZIDE 25 MG/1
25 TABLET ORAL DAILY
Qty: 90 | Refills: 3 | Status: ACTIVE | COMMUNITY
Start: 2019-12-13 | End: 1900-01-01

## 2024-04-19 NOTE — ASSESSMENT
[FreeTextEntry1] : This is a 63-year-old female with history as been reviewed above\par \par She has von Hippel-Lindau she is status post recent resection of 4 separate neoplasias. She has been told that they will confined to the kidney. She had only one lesion that needed an open resection.\par \par It should be noted that she has good healing there is no evidence of infection or breakdown her abdomen is soft\par \par Her blood pressure is under excellent control\par \par She did have some intermittent dyspnea on exertion this has disappeared she is walking extensively her EKG shows no evidence of ischemia.\par \par Appropriate blood tests have been drawn we will continue to follow.\par \par In addition she will be followed at the [facility] on a three-month basis 23

## 2024-05-13 ENCOUNTER — LABORATORY RESULT (OUTPATIENT)
Age: 69
End: 2024-05-13

## 2024-05-13 ENCOUNTER — APPOINTMENT (OUTPATIENT)
Dept: INTERNAL MEDICINE | Facility: CLINIC | Age: 69
End: 2024-05-13
Payer: MEDICARE

## 2024-05-13 VITALS — HEIGHT: 60 IN | WEIGHT: 114.19 LBS | BODY MASS INDEX: 22.42 KG/M2

## 2024-05-13 DIAGNOSIS — E78.00 PURE HYPERCHOLESTEROLEMIA, UNSPECIFIED: ICD-10-CM

## 2024-05-13 DIAGNOSIS — E83.52 HYPERCALCEMIA: ICD-10-CM

## 2024-05-13 DIAGNOSIS — D64.9 ANEMIA, UNSPECIFIED: ICD-10-CM

## 2024-05-13 PROCEDURE — 99214 OFFICE O/P EST MOD 30 MIN: CPT

## 2024-05-13 PROCEDURE — 36415 COLL VENOUS BLD VENIPUNCTURE: CPT

## 2024-05-13 PROCEDURE — G2211 COMPLEX E/M VISIT ADD ON: CPT

## 2024-05-13 RX ORDER — DENOSUMAB 60 MG/ML
60 INJECTION SUBCUTANEOUS
Refills: 0 | Status: ACTIVE | COMMUNITY

## 2024-05-13 RX ORDER — TIMOLOL MALEATE 5 MG/ML
SOLUTION/ DROPS OPHTHALMIC
Refills: 0 | Status: ACTIVE | COMMUNITY

## 2024-05-13 RX ORDER — BIMATOPROST 0.1 MG/ML
SOLUTION/ DROPS OPHTHALMIC
Refills: 0 | Status: ACTIVE | COMMUNITY

## 2024-05-13 RX ORDER — BRINZOLAMIDE/BRIMONIDINE TARTRATE 10; 2 MG/ML; MG/ML
SUSPENSION/ DROPS OPHTHALMIC
Refills: 0 | Status: ACTIVE | COMMUNITY

## 2024-05-13 NOTE — HISTORY OF PRESENT ILLNESS
[FreeTextEntry1] :  this is a 69-year-old female for evaluation and treatment of her hypertension hypercholesterolemia Osler-Weber-Rendu and anemia [de-identified] :  patient is feeling quite well she has no new complaints.

## 2024-05-13 NOTE — ASSESSMENT
[FreeTextEntry1] :   This is an upbeat 69-year-old female whose history has been reviewed above  She has a history of hypertension  her blood pressure is under excellent control she has no orthostasis no medication changes  She does have an estimated GFR of 54 we will repeat her creatinine but I do not think this represents any intrinsic glomerular or interstitial disease  She has a history of hypercholesterolemia she remains on atorvastatin a cholesterol profile has been obtained medication changes predicated on the results  She remains on Prolia for her osteoporosis

## 2024-05-14 LAB
ALBUMIN SERPL ELPH-MCNC: 4.8 G/DL
ALP BLD-CCNC: 48 U/L
ALT SERPL-CCNC: 13 U/L
ANION GAP SERPL CALC-SCNC: 12 MMOL/L
AST SERPL-CCNC: 24 U/L
BASOPHILS # BLD AUTO: 0.08 K/UL
BASOPHILS NFR BLD AUTO: 0.9 %
BILIRUB SERPL-MCNC: 0.2 MG/DL
BUN SERPL-MCNC: 27 MG/DL
CALCIUM SERPL-MCNC: 10.4 MG/DL
CHLORIDE SERPL-SCNC: 96 MMOL/L
CHOLEST SERPL-MCNC: 200 MG/DL
CO2 SERPL-SCNC: 29 MMOL/L
CREAT SERPL-MCNC: 1.16 MG/DL
EGFR: 51 ML/MIN/1.73M2
EOSINOPHIL # BLD AUTO: 0.16 K/UL
EOSINOPHIL NFR BLD AUTO: 1.9 %
ESTIMATED AVERAGE GLUCOSE: 123 MG/DL
GLUCOSE SERPL-MCNC: 102 MG/DL
HBA1C MFR BLD HPLC: 5.9 %
HCT VFR BLD CALC: 28.7 %
HDLC SERPL-MCNC: 52 MG/DL
HGB BLD-MCNC: 9.4 G/DL
IMM GRANULOCYTES NFR BLD AUTO: 0.4 %
LDLC SERPL CALC-MCNC: 109 MG/DL
LYMPHOCYTES # BLD AUTO: 1.51 K/UL
LYMPHOCYTES NFR BLD AUTO: 17.8 %
MAN DIFF?: NORMAL
MCHC RBC-ENTMCNC: 31.3 PG
MCHC RBC-ENTMCNC: 32.8 GM/DL
MCV RBC AUTO: 95.7 FL
MONOCYTES # BLD AUTO: 0.64 K/UL
MONOCYTES NFR BLD AUTO: 7.6 %
NEUTROPHILS # BLD AUTO: 6.05 K/UL
NEUTROPHILS NFR BLD AUTO: 71.4 %
NONHDLC SERPL-MCNC: 149 MG/DL
PLATELET # BLD AUTO: 312 K/UL
POTASSIUM SERPL-SCNC: 4 MMOL/L
PROT SERPL-MCNC: 7.3 G/DL
RBC # BLD: 3 M/UL
RBC # FLD: 12.2 %
SODIUM SERPL-SCNC: 136 MMOL/L
T3RU NFR SERPL: 1 TBI
T4 SERPL-MCNC: 8.1 UG/DL
TRIGL SERPL-MCNC: 230 MG/DL
TSH SERPL-ACNC: 2.29 UIU/ML
URATE SERPL-MCNC: 5.8 MG/DL
WBC # FLD AUTO: 8.47 K/UL

## 2024-05-15 ENCOUNTER — TRANSCRIPTION ENCOUNTER (OUTPATIENT)
Age: 69
End: 2024-05-15

## 2024-06-14 ENCOUNTER — NON-APPOINTMENT (OUTPATIENT)
Age: 69
End: 2024-06-14

## 2024-07-02 ENCOUNTER — RX RENEWAL (OUTPATIENT)
Age: 69
End: 2024-07-02

## 2024-08-26 NOTE — REASON FOR VISIT
Detail Level: Generalized Detail Level: Detailed Detail Level: Zone [Follow-up] : a follow-up of an existing diagnosis [FreeTextEntry1] : h. pylori

## 2024-10-02 ENCOUNTER — RX RENEWAL (OUTPATIENT)
Age: 69
End: 2024-10-02

## 2024-10-21 ENCOUNTER — APPOINTMENT (OUTPATIENT)
Dept: INTERNAL MEDICINE | Facility: CLINIC | Age: 69
End: 2024-10-21

## 2024-11-26 ENCOUNTER — RX RENEWAL (OUTPATIENT)
Age: 69
End: 2024-11-26

## 2024-11-27 ENCOUNTER — NON-APPOINTMENT (OUTPATIENT)
Age: 69
End: 2024-11-27

## 2024-11-28 ENCOUNTER — RX RENEWAL (OUTPATIENT)
Age: 69
End: 2024-11-28

## 2024-12-20 ENCOUNTER — LABORATORY RESULT (OUTPATIENT)
Age: 69
End: 2024-12-20

## 2024-12-20 ENCOUNTER — NON-APPOINTMENT (OUTPATIENT)
Age: 69
End: 2024-12-20

## 2024-12-20 ENCOUNTER — APPOINTMENT (OUTPATIENT)
Dept: INTERNAL MEDICINE | Facility: CLINIC | Age: 69
End: 2024-12-20
Payer: MEDICARE

## 2024-12-20 VITALS
DIASTOLIC BLOOD PRESSURE: 82 MMHG | WEIGHT: 114 LBS | HEIGHT: 60 IN | BODY MASS INDEX: 22.38 KG/M2 | SYSTOLIC BLOOD PRESSURE: 130 MMHG

## 2024-12-20 DIAGNOSIS — Z00.00 ENCOUNTER FOR GENERAL ADULT MEDICAL EXAMINATION W/OUT ABNORMAL FINDINGS: ICD-10-CM

## 2024-12-20 DIAGNOSIS — M81.0 AGE-RELATED OSTEOPOROSIS W/OUT CURRENT PATHOLOGICAL FRACTURE: ICD-10-CM

## 2024-12-20 DIAGNOSIS — R00.2 PALPITATIONS: ICD-10-CM

## 2024-12-20 DIAGNOSIS — Q85.83 VON HIPPEL-LINDAU SYNDROME: ICD-10-CM

## 2024-12-20 DIAGNOSIS — C64.9 MALIGNANT NEOPLASM OF UNSPECIFIED KIDNEY, EXCEPT RENAL PELVIS: ICD-10-CM

## 2024-12-20 PROCEDURE — 93000 ELECTROCARDIOGRAM COMPLETE: CPT

## 2024-12-20 PROCEDURE — 36415 COLL VENOUS BLD VENIPUNCTURE: CPT

## 2024-12-20 PROCEDURE — G0439: CPT

## 2024-12-20 RX ORDER — LATANOPROST/PF 0.005 %
0.01 DROPS OPHTHALMIC (EYE)
Refills: 0 | Status: ACTIVE | COMMUNITY

## 2024-12-20 RX ORDER — DORZOLAMIDE HYDROCHLORIDE TIMOLOL MALEATE 20; 5 MG/ML; MG/ML
2-0.5 SOLUTION/ DROPS OPHTHALMIC TWICE DAILY
Refills: 0 | Status: ACTIVE | COMMUNITY

## 2024-12-23 DIAGNOSIS — E21.3 HYPERPARATHYROIDISM, UNSPECIFIED: ICD-10-CM

## 2024-12-23 LAB
25(OH)D3 SERPL-MCNC: 55.6 NG/ML
ALBUMIN SERPL ELPH-MCNC: 4.5 G/DL
ALP BLD-CCNC: 46 U/L
ALT SERPL-CCNC: 14 U/L
ANION GAP SERPL CALC-SCNC: 13 MMOL/L
APPEARANCE: CLEAR
AST SERPL-CCNC: 20 U/L
BASOPHILS # BLD AUTO: 0.07 K/UL
BASOPHILS NFR BLD AUTO: 0.9 %
BILIRUB SERPL-MCNC: 0.3 MG/DL
BILIRUBIN URINE: NEGATIVE
BLOOD URINE: NEGATIVE
BUN SERPL-MCNC: 25 MG/DL
CALCIUM SERPL-MCNC: 10 MG/DL
CALCIUM SERPL-MCNC: 9.9 MG/DL
CHLORIDE SERPL-SCNC: 95 MMOL/L
CHOLEST SERPL-MCNC: 192 MG/DL
CO2 SERPL-SCNC: 28 MMOL/L
COLOR: YELLOW
CREAT SERPL-MCNC: 1.09 MG/DL
EGFR: 55 ML/MIN/1.73M2
EOSINOPHIL # BLD AUTO: 0.21 K/UL
EOSINOPHIL NFR BLD AUTO: 2.8 %
ESTIMATED AVERAGE GLUCOSE: 120 MG/DL
GLUCOSE QUALITATIVE U: NEGATIVE MG/DL
GLUCOSE SERPL-MCNC: 92 MG/DL
HBA1C MFR BLD HPLC: 5.8 %
HCT VFR BLD CALC: 27.3 %
HDLC SERPL-MCNC: 54 MG/DL
HGB BLD-MCNC: 9 G/DL
IMM GRANULOCYTES NFR BLD AUTO: 0.4 %
KETONES URINE: NEGATIVE MG/DL
LDLC SERPL CALC-MCNC: 112 MG/DL
LEUKOCYTE ESTERASE URINE: NEGATIVE
LYMPHOCYTES # BLD AUTO: 1.35 K/UL
LYMPHOCYTES NFR BLD AUTO: 18 %
MAN DIFF?: NORMAL
MCHC RBC-ENTMCNC: 31.7 PG
MCHC RBC-ENTMCNC: 33 G/DL
MCV RBC AUTO: 96.1 FL
MONOCYTES # BLD AUTO: 0.67 K/UL
MONOCYTES NFR BLD AUTO: 8.9 %
NEUTROPHILS # BLD AUTO: 5.17 K/UL
NEUTROPHILS NFR BLD AUTO: 69 %
NITRITE URINE: NEGATIVE
NONHDLC SERPL-MCNC: 138 MG/DL
PARATHYROID HORMONE INTACT: 82 PG/ML
PH URINE: 7.5
PLATELET # BLD AUTO: 314 K/UL
POTASSIUM SERPL-SCNC: 4 MMOL/L
PROT SERPL-MCNC: 7.1 G/DL
PROTEIN URINE: NEGATIVE MG/DL
RBC # BLD: 2.84 M/UL
RBC # FLD: 12.1 %
SODIUM SERPL-SCNC: 135 MMOL/L
SPECIFIC GRAVITY URINE: 1.01
T3RU NFR SERPL: 1 TBI
T4 SERPL-MCNC: 8.3 UG/DL
TRIGL SERPL-MCNC: 150 MG/DL
TSH SERPL-ACNC: 2.82 UIU/ML
URATE SERPL-MCNC: 5.3 MG/DL
UROBILINOGEN URINE: 0.2 MG/DL
WBC # FLD AUTO: 7.5 K/UL

## 2024-12-26 ENCOUNTER — TRANSCRIPTION ENCOUNTER (OUTPATIENT)
Age: 69
End: 2024-12-26

## 2025-03-19 ENCOUNTER — RX RENEWAL (OUTPATIENT)
Age: 70
End: 2025-03-19

## 2025-03-24 ENCOUNTER — RX RENEWAL (OUTPATIENT)
Age: 70
End: 2025-03-24

## 2025-04-24 ENCOUNTER — APPOINTMENT (OUTPATIENT)
Dept: RADIOLOGY | Facility: CLINIC | Age: 70
End: 2025-04-24
Payer: MEDICARE

## 2025-04-24 PROCEDURE — 77085 DXA BONE DENSITY AXL VRT FX: CPT

## 2025-05-27 ENCOUNTER — LABORATORY RESULT (OUTPATIENT)
Age: 70
End: 2025-05-27

## 2025-05-27 ENCOUNTER — APPOINTMENT (OUTPATIENT)
Dept: INTERNAL MEDICINE | Facility: CLINIC | Age: 70
End: 2025-05-27
Payer: MEDICARE

## 2025-05-27 VITALS
BODY MASS INDEX: 22.64 KG/M2 | WEIGHT: 115.31 LBS | SYSTOLIC BLOOD PRESSURE: 124 MMHG | DIASTOLIC BLOOD PRESSURE: 84 MMHG | HEIGHT: 60 IN

## 2025-05-27 DIAGNOSIS — E21.3 HYPERPARATHYROIDISM, UNSPECIFIED: ICD-10-CM

## 2025-05-27 DIAGNOSIS — D64.9 ANEMIA, UNSPECIFIED: ICD-10-CM

## 2025-05-27 DIAGNOSIS — E78.00 PURE HYPERCHOLESTEROLEMIA, UNSPECIFIED: ICD-10-CM

## 2025-05-27 DIAGNOSIS — I10 ESSENTIAL (PRIMARY) HYPERTENSION: ICD-10-CM

## 2025-05-27 PROCEDURE — G2211 COMPLEX E/M VISIT ADD ON: CPT

## 2025-05-27 PROCEDURE — 99214 OFFICE O/P EST MOD 30 MIN: CPT

## 2025-05-27 PROCEDURE — 36415 COLL VENOUS BLD VENIPUNCTURE: CPT

## 2025-05-28 LAB
ALBUMIN SERPL ELPH-MCNC: 4.8 G/DL
ALP BLD-CCNC: 53 U/L
ALT SERPL-CCNC: 20 U/L
ANION GAP SERPL CALC-SCNC: 16 MMOL/L
AST SERPL-CCNC: 31 U/L
BASOPHILS # BLD AUTO: 0.06 K/UL
BASOPHILS NFR BLD AUTO: 0.8 %
BILIRUB SERPL-MCNC: 0.3 MG/DL
BUN SERPL-MCNC: 28 MG/DL
CALCIUM SERPL-MCNC: 9.9 MG/DL
CHLORIDE SERPL-SCNC: 95 MMOL/L
CHOLEST SERPL-MCNC: 192 MG/DL
CO2 SERPL-SCNC: 26 MMOL/L
CREAT SERPL-MCNC: 1.11 MG/DL
EGFRCR SERPLBLD CKD-EPI 2021: 53 ML/MIN/1.73M2
EOSINOPHIL # BLD AUTO: 0.14 K/UL
EOSINOPHIL NFR BLD AUTO: 1.8 %
ESTIMATED AVERAGE GLUCOSE: 123 MG/DL
GLUCOSE SERPL-MCNC: 97 MG/DL
HBA1C MFR BLD HPLC: 5.9 %
HCT VFR BLD CALC: 27.6 %
HDLC SERPL-MCNC: 51 MG/DL
HGB BLD-MCNC: 9.1 G/DL
IMM GRANULOCYTES NFR BLD AUTO: 0.4 %
LDLC SERPL-MCNC: 101 MG/DL
LYMPHOCYTES # BLD AUTO: 1.7 K/UL
LYMPHOCYTES NFR BLD AUTO: 21.7 %
MAN DIFF?: NORMAL
MCHC RBC-ENTMCNC: 31.2 PG
MCHC RBC-ENTMCNC: 33 G/DL
MCV RBC AUTO: 94.5 FL
MONOCYTES # BLD AUTO: 0.66 K/UL
MONOCYTES NFR BLD AUTO: 8.4 %
NEUTROPHILS # BLD AUTO: 5.24 K/UL
NEUTROPHILS NFR BLD AUTO: 66.9 %
NONHDLC SERPL-MCNC: 141 MG/DL
PLATELET # BLD AUTO: 302 K/UL
POTASSIUM SERPL-SCNC: 3.5 MMOL/L
PROT SERPL-MCNC: 7.4 G/DL
RBC # BLD: 2.92 M/UL
RBC # FLD: 12 %
SODIUM SERPL-SCNC: 137 MMOL/L
T3RU NFR SERPL: 1.1 TBI
T4 SERPL-MCNC: 8.4 UG/DL
TRIGL SERPL-MCNC: 236 MG/DL
TSH SERPL-ACNC: 2.74 UIU/ML
URATE SERPL-MCNC: 6 MG/DL
WBC # FLD AUTO: 7.83 K/UL

## 2025-06-22 ENCOUNTER — NON-APPOINTMENT (OUTPATIENT)
Age: 70
End: 2025-06-22

## 2025-07-17 ENCOUNTER — RX RENEWAL (OUTPATIENT)
Age: 70
End: 2025-07-17

## 2025-08-05 ENCOUNTER — APPOINTMENT (OUTPATIENT)
Dept: INTERNAL MEDICINE | Facility: CLINIC | Age: 70
End: 2025-08-05
Payer: MEDICARE

## 2025-08-05 VITALS
HEIGHT: 60 IN | WEIGHT: 114 LBS | DIASTOLIC BLOOD PRESSURE: 80 MMHG | SYSTOLIC BLOOD PRESSURE: 120 MMHG | BODY MASS INDEX: 22.38 KG/M2

## 2025-08-05 DIAGNOSIS — R42 DIZZINESS AND GIDDINESS: ICD-10-CM

## 2025-08-05 PROCEDURE — G2211 COMPLEX E/M VISIT ADD ON: CPT

## 2025-08-05 PROCEDURE — 99213 OFFICE O/P EST LOW 20 MIN: CPT

## 2025-09-16 ENCOUNTER — RX RENEWAL (OUTPATIENT)
Age: 70
End: 2025-09-16